# Patient Record
Sex: FEMALE | Race: WHITE | NOT HISPANIC OR LATINO | Employment: FULL TIME | URBAN - METROPOLITAN AREA
[De-identification: names, ages, dates, MRNs, and addresses within clinical notes are randomized per-mention and may not be internally consistent; named-entity substitution may affect disease eponyms.]

---

## 2018-08-08 ENCOUNTER — APPOINTMENT (OUTPATIENT)
Dept: WOMENS IMAGING | Facility: HOSPITAL | Age: 43
End: 2018-08-08

## 2018-08-08 PROCEDURE — 77063 BREAST TOMOSYNTHESIS BI: CPT | Performed by: RADIOLOGY

## 2018-08-08 PROCEDURE — 77067 SCR MAMMO BI INCL CAD: CPT | Performed by: RADIOLOGY

## 2018-08-21 ENCOUNTER — APPOINTMENT (OUTPATIENT)
Dept: WOMENS IMAGING | Facility: HOSPITAL | Age: 43
End: 2018-08-21

## 2018-08-21 PROCEDURE — 77066 DX MAMMO INCL CAD BI: CPT | Performed by: RADIOLOGY

## 2018-08-21 PROCEDURE — MDREVIEWSP: Performed by: RADIOLOGY

## 2018-08-21 PROCEDURE — 76641 ULTRASOUND BREAST COMPLETE: CPT | Performed by: RADIOLOGY

## 2018-08-21 PROCEDURE — 77062 BREAST TOMOSYNTHESIS BI: CPT | Performed by: RADIOLOGY

## 2018-08-21 PROCEDURE — G0279 TOMOSYNTHESIS, MAMMO: HCPCS | Performed by: RADIOLOGY

## 2018-08-27 ENCOUNTER — APPOINTMENT (OUTPATIENT)
Dept: WOMENS IMAGING | Facility: HOSPITAL | Age: 43
End: 2018-08-27

## 2018-08-27 PROCEDURE — 19001 PUNCTURE ASPIR CYST BRST EA: CPT | Performed by: RADIOLOGY

## 2018-08-27 PROCEDURE — 76942 ECHO GUIDE FOR BIOPSY: CPT | Performed by: RADIOLOGY

## 2018-08-27 PROCEDURE — 19000 PUNCTURE ASPIR CYST BREAST: CPT | Performed by: RADIOLOGY

## 2020-03-04 ENCOUNTER — APPOINTMENT (OUTPATIENT)
Dept: WOMENS IMAGING | Facility: HOSPITAL | Age: 45
End: 2020-03-04

## 2020-03-04 PROCEDURE — 77067 SCR MAMMO BI INCL CAD: CPT | Performed by: RADIOLOGY

## 2021-09-21 ENCOUNTER — APPOINTMENT (OUTPATIENT)
Dept: GENERAL RADIOLOGY | Facility: HOSPITAL | Age: 46
End: 2021-09-21

## 2021-09-21 ENCOUNTER — HOSPITAL ENCOUNTER (EMERGENCY)
Facility: HOSPITAL | Age: 46
Discharge: HOME | End: 2021-09-21
Attending: EMERGENCY MEDICINE | Admitting: EMERGENCY MEDICINE

## 2021-09-21 VITALS
WEIGHT: 138 LBS | OXYGEN SATURATION: 97 % | TEMPERATURE: 98.4 F | RESPIRATION RATE: 16 BRPM | HEART RATE: 64 BPM | HEIGHT: 62 IN | BODY MASS INDEX: 25.4 KG/M2 | DIASTOLIC BLOOD PRESSURE: 78 MMHG | SYSTOLIC BLOOD PRESSURE: 121 MMHG

## 2021-09-21 DIAGNOSIS — S83.92XA SPRAIN OF LEFT KNEE, UNSPECIFIED LIGAMENT, INITIAL ENCOUNTER: Primary | ICD-10-CM

## 2021-09-21 PROCEDURE — 73562 X-RAY EXAM OF KNEE 3: CPT

## 2021-09-21 PROCEDURE — 99282 EMERGENCY DEPT VISIT SF MDM: CPT | Performed by: EMERGENCY MEDICINE

## 2021-09-21 PROCEDURE — 99283 EMERGENCY DEPT VISIT LOW MDM: CPT

## 2021-09-21 RX ORDER — NABUMETONE 500 MG/1
500 TABLET, FILM COATED ORAL 2 TIMES DAILY PRN
Qty: 14 TABLET | Refills: 0 | Status: SHIPPED | OUTPATIENT
Start: 2021-09-21 | End: 2021-09-29

## 2021-09-22 NOTE — DISCHARGE INSTRUCTIONS
Medication as directed.  Ice, rest, elevation.  Wear knee immobilizer as discussed.  Follow-up with orthopedics as above.  Return to ED for medical emergencies.

## 2021-09-22 NOTE — ED NOTES
Patient arrives to ER due to left knee pain after falling on a wet floor at work. Relates she cannot bend at knee. Swelling noted to knee and lower leg.      Nisha Luna RN  09/21/21 [id number]

## 2021-09-22 NOTE — ED PROVIDER NOTES
Subjective   Samantha Evans is a 46-year-old white female who present secondary to left knee injury.  Patient works at [employer]. (Part time. Her full time job is a desk job.) This evening she slipped on a wet floor and fell.  Impact on left leg.  Patient has pain in her left knee.  It radiates to the thigh.  Patient is able to bear weight and ambulate.  However she has a difficulty flexing the knee.  No other injury.  Patient presents for evaluation.      History provided by:  Patient      Review of Systems    Past Medical History:   Diagnosis Date   • Adenomyosis    • Menstrual irregularity    • Pelvic pain        Allergies   Allergen Reactions   • Hydrocodone Itching, Nausea And Vomiting and Rash       Past Surgical History:   Procedure Laterality Date   •  SECTION   &    • ENDOMETRIAL ABLATION     • HYSTERECTOMY     • MA LAP, RADICAL HYST W/ TUBE & OV, NODE BX Bilateral 2016    Procedure: TOTAL LAPAROSCOPIC HYSTERECTOMY AND MERON SALPINGECTOMY;  Surgeon: Janki Lowry MD;  Location: [facility];  Service: Gynecology       History reviewed. No pertinent family history.    Social History     Socioeconomic History   • Marital status:      Spouse name: Not on file   • Number of children: Not on file   • Years of education: Not on file   • Highest education level: Not on file   Tobacco Use   • Smoking status: Current Some Day Smoker   • Tobacco comment: 1 PP MONTH   Substance and Sexual Activity   • Alcohol use: No   • Drug use: No           Objective   Physical Exam  Musculoskeletal:      Left knee: No swelling, deformity, effusion, erythema, ecchymosis or bony tenderness. Decreased range of motion (Decreased flexion secondary to pain). No tenderness. No LCL laxity, MCL laxity, ACL laxity or PCL laxity.Normal alignment and normal patellar mobility.      Instability Tests: Anterior drawer test negative. Posterior drawer test negative. Anterior Lachman test negative. Medial  Gina test negative and lateral Gina test negative.         Procedures           ED Course  ED Course as of Sep 21 2203   Tue Sep 21, 2021   2137 No obvious contusion or swelling.  Knee feels stable.  Will review x-rays.    [SS]   2141 X-rays are unremarkable.  Will place in knee immobilizer for support.  Prescribing NSAIDs for home.  Orthopedic follow-up.    [SS]   2158 Discussed at length with patient results, diagnosis, treatment and follow-up. Knee feels better at immobilizer. Giving Ortho for follow-up. Will DC home.PrescriptionOne-nabumetone    [SS]      ED Course User Index  [SS] Imtiaz Delcid MD      XR Knee 3 View Left    Result Date: 9/21/2021  Narrative: CR Knee 3 Vws LT INDICATION: Knee pain since falling today COMPARISON: None available. FINDINGS: AP, lateral, and oblique view(s) of the left knee.  No fracture, dislocation, or effusion. No bone erosion or destruction.  No foreign body.     Impression: Negative left knee. Signer Name: Phoenix Mario MD  Signed: 9/21/2021 9:08 PM  Workstation Name: [email or web address]  [facility]         My diagnosis for lower extremity pain and injury includes but is not limited to hip fracture, femur fracture, hip dislocation, hip contusion, hip sprain, hip strain, pelvic fracture, knee sprain, patella dislocation, knee dislocation, internal derangement of knee, fractures of the femur, tibia, fibula, ankle, foot and digits, ankle sprain, ankle dislocation, Lisfranc fracture, fracture dislocations of the digits, pulmonary embolism, claudication, peripheral vascular disease, gout, osteoarthritis, rheumatoid arthritis, bursitis, septic joint, poly-rheumatica, polyarthralgia and other inflammatory or infectious disease processes.                                  MDM    Final diagnoses:   Sprain of left knee, unspecified ligament, initial encounter       ED Disposition  ED Disposition     ED Disposition Condition Comment    Discharge Stable            Deyanira Cyr, APRN  [address] [facility] [address]  [place] [id number] [phone]    Call   9:00 a.m. tomorrow morning to arrange follow-up later this week         Medication List      New Prescriptions    nabumetone 500 MG tablet  Commonly known as: RELAFEN  Take 1 tablet by mouth 2 (Two) Times a Day As Needed (For knee pain) for up to 7 days.           Where to Get Your Medications      These medications were sent to [id number] - [place], [place] [address] AT [facility] & [address] - [phone]  - [phone] [address] [place] [address], [place] [address]    Phone: [phone]   · nabumetone 500 MG tablet          Imtiaz Delcid MD  09/21/21 2202

## 2021-09-23 ENCOUNTER — TELEPHONE (OUTPATIENT)
Dept: ORTHOPEDIC SURGERY | Facility: CLINIC | Age: 46
End: 2021-09-23

## 2021-09-23 NOTE — TELEPHONE ENCOUNTER
TRIED TO CALL PATIENT NUMBER NOT IN SERVICE    SEEN IN [facility]/ HUB OK TO SCHEDULE NEXT AVAILABLE WITH DR CHUA  NEEDS TO MAKE SURE THAT SHE HAS  CLAIM NUMBER

## 2021-09-29 ENCOUNTER — OFFICE VISIT (OUTPATIENT)
Dept: ORTHOPEDIC SURGERY | Facility: CLINIC | Age: 46
End: 2021-09-29

## 2021-09-29 VITALS
RESPIRATION RATE: 16 BRPM | WEIGHT: 138 LBS | HEART RATE: 76 BPM | SYSTOLIC BLOOD PRESSURE: 109 MMHG | BODY MASS INDEX: 25.4 KG/M2 | DIASTOLIC BLOOD PRESSURE: 74 MMHG | HEIGHT: 62 IN

## 2021-09-29 DIAGNOSIS — S86.912A STRAIN OF LEFT KNEE, INITIAL ENCOUNTER: Primary | ICD-10-CM

## 2021-09-29 PROCEDURE — 99203 OFFICE O/P NEW LOW 30 MIN: CPT | Performed by: ORTHOPAEDIC SURGERY

## 2021-09-29 RX ORDER — MELOXICAM 15 MG/1
15 TABLET ORAL DAILY
Qty: 30 TABLET | Refills: 5 | Status: SHIPPED | OUTPATIENT
Start: 2021-09-29 | End: 2022-02-21

## 2021-09-29 NOTE — PROGRESS NOTES
Subjective: Left knee strain     Patient ID: Samantha Evans is a 46 y.o. female.    Chief Complaint:    History of Present Illness 46-year-old female is seen by me today for the first time regarding the left knee which he injured at work on 21 September.  She has 2 jobs 1 of which is working at [employer] and of the 21st she slipped on a wet floor.  States her feet slipped out from under her and she landed on the left side developing pain and discomfort.  Was seen in the emergency room was x-rayed placed in a knee immobilizer for comfort.  She is given Relafen to take to be taken out of as-needed basis.  Denies any prior history of knee pain or discomfort.  Her primary problem she states is gaining flexion.  She states that time the pain is 10 out of 10 describing a shooting pain but it is not a constant discomfort.       Social History     Occupational History   • Not on file   Tobacco Use   • Smoking status: Current Some Day Smoker   • Smokeless tobacco: Never Used   • Tobacco comment: 1 PP MONTH   Vaping Use   • Vaping Use: Never used   Substance and Sexual Activity   • Alcohol use: No   • Drug use: No   • Sexual activity: Defer      Review of Systems   Constitutional: Negative for chills, diaphoresis, fever and unexpected weight change.   HENT: Negative for hearing loss, nosebleeds, sore throat and tinnitus.    Eyes: Negative for pain and visual disturbance.   Respiratory: Negative for cough, shortness of breath and wheezing.    Cardiovascular: Negative for chest pain and palpitations.   Gastrointestinal: Negative for abdominal pain, diarrhea, nausea and vomiting.   Endocrine: Negative for cold intolerance, heat intolerance and polydipsia.   Genitourinary: Negative for difficulty urinating, dysuria and hematuria.   Musculoskeletal: Positive for arthralgias and myalgias. Negative for joint swelling.   Skin: Negative for rash and wound.   Allergic/Immunologic: Negative for environmental allergies.    Neurological: Negative for dizziness, syncope and numbness.   Hematological: Does not bruise/bleed easily.   Psychiatric/Behavioral: Negative for dysphoric mood and sleep disturbance. The patient is not nervous/anxious.          Past Medical History:   Diagnosis Date   • Adenomyosis    • Menstrual irregularity    • Pelvic pain      Past Surgical History:   Procedure Laterality Date   •  SECTION   &    • ENDOMETRIAL ABLATION     • HYSTERECTOMY     • [place] LAP, RADICAL HYST W/ TUBE & OV, NODE BX Bilateral 2016    Procedure: TOTAL LAPAROSCOPIC HYSTERECTOMY AND MERON SALPINGECTOMY;  Surgeon: Janki Lowry MD;  Location: [facility];  Service: Gynecology     History reviewed. No pertinent family history.      Objective:  Vitals:    21 1448   BP: 109/74   Pulse: 76   Resp: 16         21  1448   Weight: 62.6 kg (138 lb)     Body mass index is 25.24 kg/m².        Ortho Exam   AP lateral sunrise view of the knee done at the hospital today reviewed.  Completely within normal limits showing no acute or chronic changes.  She is alert and oriented x3.  Head is normocephalic and sclerae clear.  The left knee today shows no swelling effusion erythema.  She can extend completely although with some discomfort he can flex to about 50 to 60 degrees.  There is no patellofemoral crepitus.  No patella instability.  Quad hamstring function is 4 out of 5 secondary to pain.  She has no joint line tenderness with negative Gina's.  No instability in extension or flexion and no varus or valgus instability.  The tendon is primarily posteriorly in the popliteal fossa and the hamstring particularly laterally.  Her calf is nontender.  She has good distal pulses no motor or sensory deficit good capillary refill the skin is cool to touch.    Assessment:        1. Strain of left knee, initial encounter           Plan: Reviewed with the patient her x-rays history and physical findings.  I believe she strained the  left knee with this injury.  At reviewing treatment options I am to start her on meloxicam 15 mg daily but also order physical therapy.  As far as use of the knee immobilizer I told her she can wear that if she needs to for comfort but she can ambulate with the knee immobilizer without significant pain or discomfort.  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  Return to see me in 3 weeks.  Answered all questions            Work Status:    ANGELIKA query complete.    Orders:  Orders Placed This Encounter   Procedures   • Ambulatory Referral to Physical Therapy Evaluate and treat       Medications:  New Medications Ordered This Visit   Medications   • meloxicam (MOBIC) 15 MG tablet     Sig: Take 1 tablet by mouth Daily.     Dispense:  30 tablet     Refill:  5       Followup:  Return in about 3 weeks (around 10/20/2021).          Dictated utilizing Dragon dictation

## 2021-09-30 ENCOUNTER — TELEPHONE (OUTPATIENT)
Dept: ORTHOPEDIC SURGERY | Facility: CLINIC | Age: 46
End: 2021-09-30

## 2021-09-30 NOTE — TELEPHONE ENCOUNTER
Caller: PREMA    Relationship: [employer]    Best call back number:     What form or medical record are you requesting: WORK STATUS AND WORK RESTRICTIONS    Who is requesting this form or medical record from you: [employer]    How would you like to receive the form or medical records (pick-up, mail, fax):   If fax, what is the fax number:       ATTN: PREMA    Timeframe paperwork needed: ASAP

## 2021-10-01 ENCOUNTER — PATIENT ROUNDING (BHMG ONLY) (OUTPATIENT)
Dept: ORTHOPEDIC SURGERY | Facility: CLINIC | Age: 46
End: 2021-10-01

## 2021-10-04 ENCOUNTER — HOSPITAL ENCOUNTER (OUTPATIENT)
Dept: PHYSICAL THERAPY | Facility: HOSPITAL | Age: 46
Setting detail: THERAPIES SERIES
Discharge: HOME | End: 2021-10-04

## 2021-10-04 DIAGNOSIS — S86.912A STRAIN OF LEFT KNEE, INITIAL ENCOUNTER: Primary | ICD-10-CM

## 2021-10-04 PROCEDURE — 97161 PT EVAL LOW COMPLEX 20 MIN: CPT

## 2021-10-04 NOTE — THERAPY EVALUATION
Outpatient Physical Therapy Ortho Initial Evaluation   [place]     Patient Name: Samantha Evans  : 1975  MRN: [id number]  Today's Date: 10/4/2021      Visit Date: 10/04/2021    Patient Active Problem List   Diagnosis   • Pelvic pain        Past Medical History:   Diagnosis Date   • Adenomyosis    • Menstrual irregularity    • Pelvic pain         Past Surgical History:   Procedure Laterality Date   •  SECTION   &    • ENDOMETRIAL ABLATION     • HYSTERECTOMY     • [place] LAP, RADICAL HYST W/ TUBE & OV, NODE BX Bilateral 2016    Procedure: TOTAL LAPAROSCOPIC HYSTERECTOMY AND MERON SALPINGECTOMY;  Surgeon: Janki Lowry MD;  Location: [facility] MAIN OR;  Service: Gynecology       Visit Dx:     ICD-10-CM ICD-9-CM   1. Strain of left knee, initial encounter  S86.912A 844.9         Patient History     Row Name 10/04/21 0600             History    Chief Complaint  Difficulty Walking;Difficulty with daily activities;Joint stiffness;Muscle tenderness;Muscle weakness;Pain  -AS      Type of Pain  Knee pain Left  -AS      Date Current Problem(s) Began  21  -AS      Brief Description of Current Complaint  Samantha Evans is a 46-year-old white female who present secondary to left knee injury.  Patient works at [employer]. (Part time. Her full time job is a desk job). On 21 she slipped on a wet floor and fell. She did go to the ER that day where x-rays were taken and were negative for fx. She was placed in a knee immobilizer and told to follow up with Ortho MD. She did see Dr. Louise about 1 week after initial injury. She has been diagnosed with a left knee strain and referred to outpatient PT for treatment.   -AS      Patient/Caregiver Goals  Relieve pain;Return to prior level of function;Return to work;Improve mobility;Improve strength  -AS      Patient's Rating of General Health  Very good  -AS      Hand Dominance  right-handed  -AS      Patient seeing anyone else for  problem(s)?  Dr. Louise  -AS      How has patient tried to help current problem?  rest, ice, medication  -AS      What clinical tests have you had for this problem?  X-ray  -AS      Results of Clinical Tests  negative  -AS         Pain     Pain Location  Knee  -AS      Pain Frequency  Intermittent  -AS      Pain Description  Aching  -AS      What Performance Factors Make the Current Problem(s) WORSE?  stairs, squatting, walking  -AS      What Performance Factors Make the Current Problem(s) BETTER?  rest  -AS         Daily Activities    Primary Language  English  -AS      Are you able to read  Yes  -AS      Are you able to write  Yes  -AS      How does patient learn best?  Reading;Listening;Demonstration  -AS      Teaching needs identified  Home Exercise Program;Management of Condition  -AS      Patient is concerned about/has problems with  Flexibility;Performing home management (household chores, shopping, care of dependents);Performing job responsibilities/community activities (work, school,;Performing sports, recreation, and play activities;Walking  -AS      Does patient have problems with the following?  None  -AS      Barriers to learning  None  -AS      Pt Participated in POC and Goals  Yes  -AS         Safety    Are you being hurt, hit, or frightened by anyone at home or in your life?  No  -AS      Are you being neglected by a caregiver  No  -AS        User Key  (r) = Recorded By, (t) = Taken By, (c) = Cosigned By    Initials Name Provider Type    AS Bry Sargent, PT Physical Therapist          PT Ortho     Row Name 10/04/21 0600       Precautions and Contraindications    Precautions/Limitations  no known precautions/limitations  -AS       Subjective Pain    Able to rate subjective pain?  yes  -AS    Pre-Treatment Pain Level  3  -AS    Post-Treatment Pain Level  2  -AS       Posture/Observations    Posture- WNL  Posture is WNL  -AS    Observations  Edema;Muscle atrophy  -AS       Patellar Accessory  Motions    Superior glide  Left:;WNL  -AS    Inferior glide  Left:;WNL  -AS    Medial glide  Left:;WNL  -AS    Lateral glide  Left:;WNL  -AS       Knee Special Tests    Anterior drawer (ACL lesion)  Left:;Negative  -AS    Valgus stress (MCL lesion)  Left:;Negative  -AS    Varus stress (LCL lesion)  Left:;Negative  -AS    Gina’s test (meniscal lesion)  Left:;Negative  -AS    Bounce home test (meniscal lesion)  Left:;Negative  -AS       Left Lower Ext    Lt Knee Extension/Flexion AROM  0-135 degrees post stretch 0-125 degrees pre stretch  -AS       MMT Left Lower Ext    Lt Hip Flexion MMT, Gross Movement  (4/5) good  -AS    Lt Hip Extension MMT, Gross Movement  (4/5) good  -AS    Lt Hip ABduction MMT, Gross Movement  (4/5) good  -AS    Lt Knee Extension MMT, Gross Movement  (4/5) good  -AS    Lt Knee Flexion MMT, Gross Movement  (4/5) good  -AS       Sensation    Sensation WNL?  WNL  -AS    Light Touch  No apparent deficits  -AS       Lower Extremity Flexibility    Hamstrings  Left:;Mildly limited  -AS       Gait/Stairs (Locomotion)    Comment (Gait/Stairs)  Patient ambulates with a normal heel to toe gait pattern and speed withgout the use of an AD ot knee brace at this time.  -AS      User Key  (r) = Recorded By, (t) = Taken By, (c) = Cosigned By    Initials Name Provider Type    AS Bry Sargent, PT Physical Therapist                      Therapy Education  Given: HEP, Symptoms/condition management, Pain management, Edema management  Program: New  How Provided: Verbal, Demonstration, Written  Provided to: Patient  Level of Understanding: Teach back education performed, Verbalized, Demonstrated     PT OP Goals     Row Name 10/04/21 0600          PT Short Term Goals    STG Date to Achieve  10/18/21  -AS     STG 1  Patient to demonstrate compliance with her initial HEP for flexibility, ROM and strengthening.  -AS     STG 2  Patient to report left knee pain on VAS of 5-6/10 at worst with activity.  -AS      STG 3  Patient to demonstrate improved left knee and RLE strength to 4+/5 in all planes.  -AS        Long Term Goals    LTG Date to Achieve  11/01/21  -AS     LTG 1  Patient to demonstrate compliance with her advanced HEP for flexibility, ROM and strengthening.  -AS     LTG 2  Patient to report left knee pain on VAS of 0-1/10 at worst with activity.  -AS     LTG 3  Patient to demonstrate improved left knee and RLE strength to 5/5 in all planes.  -AS     LTG 4  Patient to demonstrate improved left knee ROM to 0-135 degrees.  -AS     LTG 5  Patient to report overall improved function on LEFS to 70/80.  -AS        Time Calculation    PT Goal Re-Cert Due Date  11/01/21  -AS       User Key  (r) = Recorded By, (t) = Taken By, (c) = Cosigned By    Initials Name Provider Type    AS Bry Sargent, PT Physical Therapist          PT Assessment/Plan     Row Name 10/04/21 0600          PT Assessment    Functional Limitations  Limitation in home management;Limitations in community activities;Performance in leisure activities;Performance in sport activities;Performance in work activities  -AS     Impairments  Edema;Impaired flexibility;Muscle strength;Pain;Range of motion  -AS     Assessment Comments  Patient reports to outpatient PT with complaints of left knee pain after slipping and falling while at work on 9-21-21. Patient has limited right knee ROM, limited right LE strength, and increased right knee pain and discomfort with activity. Patient has limited function at this time secondary to the above.  -AS     Please refer to paper survey for additional self-reported information  Yes  -AS     Rehab Potential  Good  -AS     Patient/caregiver participated in establishment of treatment plan and goals  Yes  -AS     Patient would benefit from skilled therapy intervention  Yes  -AS        PT Plan    PT Frequency  1x/week;2x/week  -AS     Predicted Duration of Therapy Intervention (PT)  2-4 weeks  -AS     Planned CPT's?  PT  RE-EVAL: 00836;PT THER PROC EA 15 MIN: 61355;PT THER ACT EA 15 MIN: 71206;PT MANUAL THERAPY EA 15 MIN: 31038;PT NEUROMUSC RE-EDUCATION EA 15 MIN: 31635  -AS       User Key  (r) = Recorded By, (t) = Taken By, (c) = Cosigned By    Initials Name Provider Type    AS Bry Sargent, PT Physical Therapist            OP Exercises     Row Name 10/04/21 0600             Subjective Pain    Able to rate subjective pain?  yes  -AS      Pre-Treatment Pain Level  3  -AS      Post-Treatment Pain Level  2  -AS         Exercise 1    Exercise Name 1  Heel Slides  -AS      Time 1  5 min  -AS         Exercise 2    Exercise Name 2  HS Stretch  -AS      Reps 2  10  -AS      Time 2  10 sec hold each  -AS         Exercise 3    Exercise Name 3  QS  -AS      Reps 3  25  -AS      Time 3  5 sec hold each  -AS         Exercise 4    Exercise Name 4  4-Way Hip  -AS      Reps 4  25 each  -AS         Exercise 5    Exercise Name 5  SAQ Ball Squeeze  -AS      Reps 5  25  -AS         Exercise 6    Exercise Name 6  LAQ  -AS      Reps 6  25  -AS         Exercise 7    Exercise Name 7  TKE  -AS      Reps 7  25  -AS      Time 7  Gold  -AS         Exercise 8    Exercise Name 8  Mini Squats  -AS      Reps 8  25  -AS        User Key  (r) = Recorded By, (t) = Taken By, (c) = Cosigned By    Initials Name Provider Type    AS Bry Sargent, PT Physical Therapist                        Outcome Measure Options: Lower Extremity Functional Scale (LEFS)  Lower Extremity Functional Index  Any of your usual work, housework or school activities: A little bit of difficulty  Your usual hobbies, recreational or sporting activities: Moderate difficulty  Getting into or out of the bath: A little bit of difficulty  Walking between rooms: No difficulty  Putting on your shoes or socks: A little bit of difficulty  Squatting: Quite a bit of difficulty  Lifting an object, like a bag of groceries from the floor: A little bit of difficulty  Performing light activities  around your home: A little bit of difficulty  Performing heavy activities around your home: Quite a bit of difficulty  Getting into or out of a car: A little bit of difficulty  Walking 2 blocks: No difficulty  Walking a mile: No difficulty  Going up or down 10 stairs (about 1 flight of stairs): Quite a bit of difficulty  Standing for 1 hour: No difficulty  Sitting for 1 hour: A little bit of difficulty  Running on even ground: Extreme difficulty or unable to perform activity  Running on uneven ground: Extreme difficulty or unable to perform activity  Making sharp turns while running fast: Extreme difficulty or unable to perform activity  Hopping: Extreme difficulty or unable to perform activity  Rolling over in bed: A little bit of difficulty  Total: 45      Time Calculation:     Start Time: 0600  Stop Time: 0658  Time Calculation (min): 58 min     Therapy Charges for Today     Code Description Service Date Service Provider Modifiers Qty    84716496718 HC PT EVAL LOW COMPLEXITY 4 10/4/2021 Bry Sargent, PT GP 1          PT G-Codes  Outcome Measure Options: Lower Extremity Functional Scale (LEFS)  Total: 45         Bry Sargent, YU  10/4/2021

## 2021-10-07 ENCOUNTER — HOSPITAL ENCOUNTER (OUTPATIENT)
Dept: PHYSICAL THERAPY | Facility: HOSPITAL | Age: 46
Setting detail: THERAPIES SERIES
Discharge: HOME | End: 2021-10-07

## 2021-10-07 DIAGNOSIS — S86.912A STRAIN OF LEFT KNEE, INITIAL ENCOUNTER: Primary | ICD-10-CM

## 2021-10-07 PROCEDURE — 97110 THERAPEUTIC EXERCISES: CPT

## 2021-10-07 NOTE — THERAPY TREATMENT NOTE
"    Outpatient Physical Therapy Ortho Treatment Note   July Stauffer     Patient Name: Samantha Evans  : 1975  MRN: [id number]  Today's Date: 10/7/2021      Visit Date: 10/07/2021    Visit Dx:    ICD-10-CM ICD-9-CM   1. Strain of left knee, initial encounter  S86.912A 844.9       Patient Active Problem List   Diagnosis   • Pelvic pain        Past Medical History:   Diagnosis Date   • Adenomyosis    • Menstrual irregularity    • Pelvic pain         Past Surgical History:   Procedure Laterality Date   •  SECTION   &    • ENDOMETRIAL ABLATION     • HYSTERECTOMY     • [place] LAP, RADICAL HYST W/ TUBE & OV, NODE BX Bilateral 2016    Procedure: TOTAL LAPAROSCOPIC HYSTERECTOMY AND MERON SALPINGECTOMY;  Surgeon: Janki Lowry MD;  Location: [facility];  Service: Gynecology                       PT Assessment/Plan     Row Name 10/07/21 0500          PT Assessment    Assessment Comments  Patient presents with increased sorenss in her left knee. Patient continues to have difficulty with knee flexion. She does have improved strength, however, she reports soreness in her left knee with her exercises.  -AS        PT Plan    PT Plan Comments  Continue with current treatment plan.  -AS       User Key  (r) = Recorded By, (t) = Taken By, (c) = Cosigned By    Initials Name Provider Type    AS Bry Sargent, PT Physical Therapist            OP Exercises     Row Name 10/07/21 0500             Subjective Comments    Subjective Comments  Patient states her knee \"felt fine\" until she was in her recliner last night and went to push her recliner back and felt pain in her hamstring behind her knee. She states she is now sore this morning.  -AS         Exercise 1    Exercise Name 1  Heel Slides  -AS      Time 1  5 min  -AS         Exercise 2    Exercise Name 2  HS Stretch  -AS      Reps 2  10  -AS      Time 2  10 sec hold each  -AS         Exercise 3    Exercise Name 3  QS  -AS      Reps 3  25  -AS      Time " 3  5 sec hold each  -AS         Exercise 4    Exercise Name 4  4-Way Hip  -AS      Reps 4  25 each  -AS      Time 4  1# each  -AS         Exercise 5    Exercise Name 5  SAQ Ball Squeeze  -AS      Reps 5  40  -AS      Time 5  1#  -AS         Exercise 6    Exercise Name 6  LAQ  -AS      Reps 6  40  -AS      Time 6  1#  -AS         Exercise 7    Exercise Name 7  TKE  -AS      Reps 7  40  -AS      Time 7  Gold  -AS         Exercise 8    Exercise Name 8  Mini Squats  -AS      Reps 8  40  -AS         Exercise 9    Exercise Name 9  Lateral Dips - 2 inch step  -AS      Reps 9  25  -AS         Exercise 10    Exercise Name 10  Prone Quad Stretch  -AS      Reps 10  10  -AS      Time 10  10 sec hold each  -AS        User Key  (r) = Recorded By, (t) = Taken By, (c) = Cosigned By    Initials Name Provider Type    AS Bry Sargent, PT Physical Therapist                                          Time Calculation:   Start Time: 0550  Stop Time: 0624  Time Calculation (min): 34 min  Therapy Charges for Today     Code Description Service Date Service Provider Modifiers Qty    33712972354  PT THER PROC EA 15 MIN 10/7/2021 Bry Sargent, PT GP 2                    Bry Sargent, PT  10/7/2021

## 2021-10-12 ENCOUNTER — HOSPITAL ENCOUNTER (OUTPATIENT)
Dept: PHYSICAL THERAPY | Facility: HOSPITAL | Age: 46
Setting detail: THERAPIES SERIES
Discharge: HOME | End: 2021-10-12

## 2021-10-12 DIAGNOSIS — S86.912A STRAIN OF LEFT KNEE, INITIAL ENCOUNTER: Primary | ICD-10-CM

## 2021-10-12 PROCEDURE — 97110 THERAPEUTIC EXERCISES: CPT

## 2021-10-12 NOTE — THERAPY TREATMENT NOTE
"    Outpatient Physical Therapy Ortho Treatment Note   July Stauffer     Patient Name: Samantha Evans  : 1975  MRN: [id number]  Today's Date: 10/12/2021      Visit Date: 10/12/2021    Visit Dx:    ICD-10-CM ICD-9-CM   1. Strain of left knee, initial encounter  S86.912A 844.9       Patient Active Problem List   Diagnosis   • Pelvic pain        Past Medical History:   Diagnosis Date   • Adenomyosis    • Menstrual irregularity    • Pelvic pain         Past Surgical History:   Procedure Laterality Date   •  SECTION   &    • ENDOMETRIAL ABLATION     • HYSTERECTOMY     • [place] LAP, RADICAL HYST W/ TUBE & OV, NODE BX Bilateral 2016    Procedure: TOTAL LAPAROSCOPIC HYSTERECTOMY AND MERON SALPINGECTOMY;  Surgeon: Janki Lowry MD;  Location: [facility];  Service: Gynecology                        PT Assessment/Plan     Row Name 10/12/21 0600          PT Assessment    Assessment Comments Patient presents today with improved left knee ROM and decreased pain and stiffness. However, with testing patient has S/S of possible meniscus involvement. Feel additional imaging such as MRI would be beneficial at this time to rule out meniscus involvement. Patient has pain with deep squatting.  -AS            PT Plan    PT Plan Comments Continue with current treatment plan.  -AS           User Key  (r) = Recorded By, (t) = Taken By, (c) = Cosigned By    Initials Name Provider Type    AS Bry Sargent, PT Physical Therapist                   OP Exercises     Row Name 10/12/21 0600             Subjective Comments    Subjective Comments Patient states her knee feels a little better this morning. She states it was \"real stiff and tender this weekend\". She reports she is scheduled to see her MD next week.  -AS              Exercise 1    Exercise Name 1 Heel Slides  -AS      Time 1 2 min  -AS              Exercise 2    Exercise Name 2 HS Stretch  -AS      Reps 2 10  -AS      Time 2 10 sec hold each  -AS        "       Exercise 3    Exercise Name 3 QS  -AS      Reps 3 25  -AS      Time 3 5 sec hold each  -AS              Exercise 4    Exercise Name 4 4-Way Hip  -AS      Reps 4 25 each  -AS      Time 4 1# each  -AS              Exercise 5    Exercise Name 5 SAQ Ball Squeeze  -AS      Reps 5 40  -AS      Time 5 1#  -AS              Exercise 6    Exercise Name 6 LAQ  -AS      Reps 6 40  -AS      Time 6 1#  -AS              Exercise 7    Exercise Name 7 TKE  -AS      Reps 7 40  -AS      Time 7 Gold  -AS              Exercise 8    Exercise Name 8 Mini Squats  -AS      Reps 8 40  -AS              Exercise 9    Exercise Name 9 Lateral Dips - 2 inch step  -AS      Reps 9 25  -AS              Exercise 10    Exercise Name 10 Prone Quad Stretch  -AS      Reps 10 10  -AS      Time 10 10 sec hold each  -AS            User Key  (r) = Recorded By, (t) = Taken By, (c) = Cosigned By    Initials Name Provider Type    AS Bry Sargent, PT Physical Therapist                                                Time Calculation:   Start Time: 0556  Stop Time: 0627  Time Calculation (min): 31 min  Therapy Charges for Today     Code Description Service Date Service Provider Modifiers Qty    24379103922  PT THER PROC EA 15 MIN 10/12/2021 Bry Sargent, PT GP 2                    Bry Sargent, PT  10/12/2021

## 2021-10-14 ENCOUNTER — HOSPITAL ENCOUNTER (OUTPATIENT)
Dept: PHYSICAL THERAPY | Facility: HOSPITAL | Age: 46
Setting detail: THERAPIES SERIES
Discharge: HOME | End: 2021-10-14

## 2021-10-14 DIAGNOSIS — S86.912A STRAIN OF LEFT KNEE, INITIAL ENCOUNTER: Primary | ICD-10-CM

## 2021-10-14 PROCEDURE — 97110 THERAPEUTIC EXERCISES: CPT

## 2021-10-14 NOTE — THERAPY TREATMENT NOTE
Outpatient Physical Therapy Ortho Treatment Note   [place]     Patient Name: Samantha Evans  : 1975  MRN: [id number]  Today's Date: 10/14/2021      Visit Date: 10/14/2021    Visit Dx:    ICD-10-CM ICD-9-CM   1. Strain of left knee, initial encounter  S86.912A 844.9       Patient Active Problem List   Diagnosis   • Pelvic pain        Past Medical History:   Diagnosis Date   • Adenomyosis    • Menstrual irregularity    • Pelvic pain         Past Surgical History:   Procedure Laterality Date   •  SECTION   &    • ENDOMETRIAL ABLATION     • HYSTERECTOMY     • [place] LAP, RADICAL HYST W/ TUBE & OV, NODE BX Bilateral 2016    Procedure: TOTAL LAPAROSCOPIC HYSTERECTOMY AND MERON SALPINGECTOMY;  Surgeon: Janki Lowry MD;  Location: [facility];  Service: Gynecology                        PT Assessment/Plan     Row Name 10/14/21 06          PT Assessment    Assessment Comments Patient continues to have symptoms in left knee, particularly posterior lateral corner. She does have negative meniscus special test, however she is very point tender to deep palpation to posterior lateral corner. She reports pain increased with squatting, stairs, pushing her recliner down, and getting, into/out of tub as well as any pivoting on a fixed left foot. Feel patient would benefit from additional imaging to rule out posible meniscus involvement. Plan on patient continueing with HEP until seen by her MD for follow up which is scheduled for 10-21-21.  -AS            PT Plan    PT Plan Comments Continue with current treatment plan.  -AS           User Key  (r) = Recorded By, (t) = Taken By, (c) = Cosigned By    Initials Name Provider Type    AS Bry Sargent, PT Physical Therapist                   OP Exercises     Row Name 10/14/21 0600             Subjective Comments    Subjective Comments Patient states her knee is doing some better but she continues to have symptoms and issues with certain  activities or movements such as squatting, stairs, and getting into/out of shower. Patient states she has pain when she is in her recliner and she goes to put the foot rest down. She reports pain is posterior knee but feels deep and she continues to have swelling which makes her knee feel tight when she bends it.  -AS              Exercise 1    Exercise Name 1 Heel Slides  -AS              Exercise 2    Exercise Name 2 HS Stretch  -AS      Reps 2 10  -AS      Time 2 10 sec hold each  -AS              Exercise 3    Exercise Name 3 QS  -AS      Reps 3 25  -AS      Time 3 5 sec hold each  -AS              Exercise 4    Exercise Name 4 4-Way Hip  -AS      Reps 4 25 each  -AS      Time 4 1# each  -AS              Exercise 5    Exercise Name 5 SAQ Ball Squeeze  -AS      Reps 5 40  -AS      Time 5 1#  -AS              Exercise 6    Exercise Name 6 LAQ  -AS      Reps 6 40  -AS      Time 6 1#  -AS              Exercise 7    Exercise Name 7 TKE  -AS      Reps 7 40  -AS      Time 7 Gold  -AS              Exercise 8    Exercise Name 8 Mini Squats  -AS      Reps 8 40  -AS              Exercise 9    Exercise Name 9 Lateral Dips - 2 inch step  -AS      Reps 9 25  -AS              Exercise 10    Exercise Name 10 Prone Quad Stretch  -AS      Reps 10 10  -AS      Time 10 10 sec hold each  -AS              Exercise 11    Exercise Name 11 HS vs Stool  -AS      Reps 11 25  -AS              Exercise 12    Exercise Name 12 Seated HS Curl vs Band  -AS      Reps 12 25  -AS      Time 12 Blue  -AS              Exercise 13    Exercise Name 13 Standing HS Curl  -AS      Reps 13 25  -AS      Time 13 1#  -AS            User Key  (r) = Recorded By, (t) = Taken By, (c) = Cosigned By    Initials Name Provider Type    AS Bry Sargent, PT Physical Therapist                                                Time Calculation:   Start Time: 0558  Stop Time: 0634  Time Calculation (min): 36 min  Therapy Charges for Today     Code Description Service  Date Service Provider Modifiers Qty    76504504351  PT THER PROC EA 15 MIN 10/14/2021 Bry Sargent, PT GP 2                    Bry Sargent, PT  10/14/2021

## 2021-10-26 ENCOUNTER — OFFICE VISIT (OUTPATIENT)
Dept: ORTHOPEDIC SURGERY | Facility: CLINIC | Age: 46
End: 2021-10-26

## 2021-10-26 VITALS — HEIGHT: 62 IN | BODY MASS INDEX: 25.4 KG/M2 | WEIGHT: 138 LBS

## 2021-10-26 DIAGNOSIS — S86.912A STRAIN OF LEFT KNEE, INITIAL ENCOUNTER: Primary | ICD-10-CM

## 2021-10-26 DIAGNOSIS — M25.562 ACUTE PAIN OF LEFT KNEE: ICD-10-CM

## 2021-10-26 PROCEDURE — 99213 OFFICE O/P EST LOW 20 MIN: CPT | Performed by: ORTHOPAEDIC SURGERY

## 2021-10-26 PROCEDURE — 20610 DRAIN/INJ JOINT/BURSA W/O US: CPT | Performed by: ORTHOPAEDIC SURGERY

## 2021-10-26 RX ORDER — TRIAMCINOLONE ACETONIDE 40 MG/ML
40 INJECTION, SUSPENSION INTRA-ARTICULAR; INTRAMUSCULAR
Status: COMPLETED | OUTPATIENT
Start: 2021-10-26 | End: 2021-10-26

## 2021-10-26 RX ORDER — LIDOCAINE HYDROCHLORIDE 10 MG/ML
4 INJECTION, SOLUTION EPIDURAL; INFILTRATION; INTRACAUDAL; PERINEURAL
Status: COMPLETED | OUTPATIENT
Start: 2021-10-26 | End: 2021-10-26

## 2021-10-26 RX ADMIN — LIDOCAINE HYDROCHLORIDE 4 ML: 10 INJECTION, SOLUTION EPIDURAL; INFILTRATION; INTRACAUDAL; PERINEURAL at 15:54

## 2021-10-26 RX ADMIN — TRIAMCINOLONE ACETONIDE 40 MG: 40 INJECTION, SUSPENSION INTRA-ARTICULAR; INTRAMUSCULAR at 15:54

## 2021-10-26 NOTE — PROGRESS NOTES
Subjective: Left knee pain     Patient ID: Samantha Evans is a 46 y.o. female.    Chief Complaint:    History of Present Illness patient returns reporting persistent pain discomfort in the left knee. Not primarily posterior is still having difficulty any all activities of daily living. Has been taken meloxicam faithfully.       Social History     Occupational History   • Not on file   Tobacco Use   • Smoking status: Current Some Day Smoker   • Smokeless tobacco: Never Used   • Tobacco comment: 1 PP MONTH   Vaping Use   • Vaping Use: Never used   Substance and Sexual Activity   • Alcohol use: No   • Drug use: No   • Sexual activity: Defer      Review of Systems   Constitutional: Negative for chills, diaphoresis, fever and unexpected weight change.   HENT: Negative for hearing loss, nosebleeds, sore throat and tinnitus.    Eyes: Negative for pain and visual disturbance.   Respiratory: Negative for cough, shortness of breath and wheezing.    Cardiovascular: Negative for chest pain and palpitations.   Gastrointestinal: Negative for abdominal pain, diarrhea, nausea and vomiting.   Endocrine: Negative for cold intolerance, heat intolerance and polydipsia.   Genitourinary: Negative for difficulty urinating, dysuria and hematuria.   Musculoskeletal: Positive for arthralgias and myalgias. Negative for joint swelling.   Skin: Negative for rash and wound.   Allergic/Immunologic: Negative for environmental allergies.   Neurological: Negative for dizziness, syncope and numbness.   Hematological: Does not bruise/bleed easily.   Psychiatric/Behavioral: Negative for dysphoric mood and sleep disturbance. The patient is not nervous/anxious.          Past Medical History:   Diagnosis Date   • Adenomyosis    • Menstrual irregularity    • Pelvic pain      Past Surgical History:   Procedure Laterality Date   •  SECTION   &    • ENDOMETRIAL ABLATION     • HYSTERECTOMY     • [place] LAP, RADICAL HYST W/ TUBE & OV, NODE BX  Bilateral 4/5/2016    Procedure: TOTAL LAPAROSCOPIC HYSTERECTOMY AND MERON SALPINGECTOMY;  Surgeon: Janki Lowry MD;  Location: [facility] OR;  Service: Gynecology     No family history on file.      Objective:  There were no vitals filed for this visit.      10/26/21  1545   Weight: 62.6 kg (138 lb)     Body mass index is 25.24 kg/m².        Ortho Exam   She is alert and oriented x3. The knee shows no swelling effusion erythema there is no crepitance with range of motion but there is moderate posterior knee pain to palpation. No joint line tenderness anteriorly. Quad and hamstring function is 4-1/2 out of 5 secondary to pain. No instability 0 90 degrees nor any varus or valgus instability at 10 to 30 degrees. Calf is nontender. Good distal pulses no motor or sensory deficit. There is no external bruising or ecchymosis. Tolerating meloxicam. She completed physical therapy and is having persistent posterior knee pain.    Assessment:        1. Strain of left knee, initial encounter    2. Acute pain of left knee           Plan: Reviewed the patient's physical exam history and treatment rendered today. After reviewing treatment options him to proceed with a cortisone injection of lidocaine Kenalog given to the superolateral portal after sterile prep without complications. Remain off work from her  job till she returns in 2 weeks if she is not asymptomatic significantly improved we will get an MRI. Answered all questions  Large Joint Arthrocentesis: L knee  Date/Time: 10/26/2021 3:54 PM  Consent given by: patient  Site marked: site marked  Timeout: Immediately prior to procedure a time out was called to verify the correct patient, procedure, equipment, support staff and site/side marked as required   Supporting Documentation  Indications: pain   Procedure Details  Location: knee - L knee  Preparation: Patient was prepped and draped in the usual sterile fashion  Needle size: 22 G  Approach: superior  (lateral)  Medications administered: 40 mg triamcinolone acetonide 40 MG/ML; 4 mL lidocaine PF 1% 1 %  Patient tolerance: patient tolerated the procedure well with no immediate complications                  Work Status:    ANGELIKA query complete.    Orders:  Orders Placed This Encounter   Procedures   • Large Joint Arthrocentesis: L knee       Medications:  No orders of the defined types were placed in this encounter.      Followup:  Return in about 2 weeks (around 11/9/2021).          Dictated utilizing Dragon dictation

## 2021-11-03 ENCOUNTER — APPOINTMENT (OUTPATIENT)
Dept: WOMENS IMAGING | Facility: HOSPITAL | Age: 46
End: 2021-11-03

## 2021-11-03 PROCEDURE — 77062 BREAST TOMOSYNTHESIS BI: CPT | Performed by: RADIOLOGY

## 2021-11-03 PROCEDURE — 77066 DX MAMMO INCL CAD BI: CPT | Performed by: RADIOLOGY

## 2021-11-03 PROCEDURE — 76641 ULTRASOUND BREAST COMPLETE: CPT | Performed by: RADIOLOGY

## 2021-11-03 PROCEDURE — G0279 TOMOSYNTHESIS, MAMMO: HCPCS | Performed by: RADIOLOGY

## 2021-11-11 ENCOUNTER — OFFICE VISIT (OUTPATIENT)
Dept: ORTHOPEDIC SURGERY | Facility: CLINIC | Age: 46
End: 2021-11-11

## 2021-11-11 VITALS — WEIGHT: 138 LBS | HEIGHT: 62 IN | BODY MASS INDEX: 25.4 KG/M2

## 2021-11-11 DIAGNOSIS — M25.562 ACUTE PAIN OF LEFT KNEE: ICD-10-CM

## 2021-11-11 DIAGNOSIS — S86.912A STRAIN OF LEFT KNEE, INITIAL ENCOUNTER: Primary | ICD-10-CM

## 2021-11-11 PROCEDURE — 99213 OFFICE O/P EST LOW 20 MIN: CPT | Performed by: ORTHOPAEDIC SURGERY

## 2021-11-11 NOTE — PROGRESS NOTES
Subjective:  Left knee pain   Patient ID: Samantha Evans is a 46 y.o. female.    Chief Complaint:    History of Present Illness patient returns has noted improvement following the injection but still having persistent posterior lateral pain when she squats.  Has been taking the anti-inflammatory and tolerating well the posterior lateral pain is not resolved.  She is ambulated without the knee immobilizer.       Social History     Occupational History   • Not on file   Tobacco Use   • Smoking status: Current Some Day Smoker   • Smokeless tobacco: Never Used   • Tobacco comment: 1 PP MONTH   Vaping Use   • Vaping Use: Never used   Substance and Sexual Activity   • Alcohol use: No   • Drug use: No   • Sexual activity: Defer      Review of Systems   Constitutional: Negative for chills, diaphoresis, fever and unexpected weight change.   HENT: Negative for hearing loss, nosebleeds, sore throat and tinnitus.    Eyes: Negative for pain and visual disturbance.   Respiratory: Negative for cough, shortness of breath and wheezing.    Cardiovascular: Negative for chest pain and palpitations.   Gastrointestinal: Negative for abdominal pain, diarrhea, nausea and vomiting.   Endocrine: Negative for cold intolerance, heat intolerance and polydipsia.   Genitourinary: Negative for difficulty urinating, dysuria and hematuria.   Musculoskeletal: Positive for arthralgias and myalgias. Negative for joint swelling.   Skin: Negative for rash and wound.   Allergic/Immunologic: Negative for environmental allergies.   Neurological: Negative for dizziness, syncope and numbness.   Hematological: Does not bruise/bleed easily.   Psychiatric/Behavioral: Negative for dysphoric mood and sleep disturbance. The patient is not nervous/anxious.          Past Medical History:   Diagnosis Date   • Adenomyosis    • Menstrual irregularity    • Pelvic pain      Past Surgical History:   Procedure Laterality Date   •  SECTION   &    •  ENDOMETRIAL ABLATION     • HYSTERECTOMY     • [place] LAP, RADICAL HYST W/ TUBE & OV, NODE BX Bilateral 4/5/2016    Procedure: TOTAL LAPAROSCOPIC HYSTERECTOMY AND MERON SALPINGECTOMY;  Surgeon: Janki Lowry MD;  Location: [facility];  Service: Gynecology     No family history on file.      Objective:  There were no vitals filed for this visit.      11/11/21  1547   Weight: 62.6 kg (138 lb)     Body mass index is 25.24 kg/m².        Ortho Exam   She is alert and oriented x3.  The left knee shows no swelling effusion erythema she now has 0 to 125 degrees of motion but she is having posterior lateral pain with flexion.  Quad hamstring function is 5/5.  Mild lateral joint line tenderness no tenderness medially.  No instability 0 90 degrees nor any varus or valgus instability at 10 to 30 degrees.  Calf nontender.  Distal pulses no motor or sensory deficit.  Skin is cool to touch.  Tolerating the meloxicam    Assessment:        1. Strain of left knee, initial encounter    2. Acute pain of left knee           Plan: Patient is improved but having persistent posterior lateral pain with flexion I want proceed with an MRI to rule out a lateral meniscal tear involving the posterior horn.  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  Return to see me with results of the MRI.  Continue the anti-inflammatory.  Answered all questions            Work Status:    ANGELIKA query complete.    Orders:  No orders of the defined types were placed in this encounter.      Medications:  No orders of the defined types were placed in this encounter.      Followup:  Return in about 18 days (around 11/29/2021).          Dictated utilizing Dragon dictation

## 2021-11-18 ENCOUNTER — APPOINTMENT (OUTPATIENT)
Dept: WOMENS IMAGING | Facility: HOSPITAL | Age: 46
End: 2021-11-18

## 2021-11-18 PROCEDURE — 19001 PUNCTURE ASPIR CYST BRST EA: CPT | Performed by: RADIOLOGY

## 2021-11-18 PROCEDURE — 19000 PUNCTURE ASPIR CYST BREAST: CPT | Performed by: RADIOLOGY

## 2021-12-03 ENCOUNTER — HOSPITAL ENCOUNTER (OUTPATIENT)
Dept: MRI IMAGING | Facility: HOSPITAL | Age: 46
Discharge: HOME | End: 2021-12-03
Admitting: ORTHOPAEDIC SURGERY

## 2021-12-03 DIAGNOSIS — S86.912A STRAIN OF LEFT KNEE, INITIAL ENCOUNTER: ICD-10-CM

## 2021-12-03 DIAGNOSIS — M25.562 ACUTE PAIN OF LEFT KNEE: ICD-10-CM

## 2021-12-03 PROCEDURE — 73721 MRI JNT OF LWR EXTRE W/O DYE: CPT

## 2021-12-09 ENCOUNTER — OFFICE VISIT (OUTPATIENT)
Dept: ORTHOPEDIC SURGERY | Facility: CLINIC | Age: 46
End: 2021-12-09

## 2021-12-09 VITALS — BODY MASS INDEX: 24.84 KG/M2 | HEIGHT: 62 IN | WEIGHT: 135 LBS

## 2021-12-09 DIAGNOSIS — S83.522A RUPTURE OF POSTERIOR CRUCIATE LIGAMENT OF LEFT KNEE, INITIAL ENCOUNTER: Primary | ICD-10-CM

## 2021-12-09 PROCEDURE — 99213 OFFICE O/P EST LOW 20 MIN: CPT | Performed by: ORTHOPAEDIC SURGERY

## 2021-12-09 NOTE — PROGRESS NOTES
Subjective: Left knee pain     Patient ID: Samantha Evans is a 46 y.o. female.    Chief Complaint:    History of Present Illness patient returns with results of the MRI his images and report are personally reviewed.  Does show a high-grade complete versus near complete tear mid substance of the PCL.  No other abnormality noted.  Patient states with normal walking other activities she is asymptomatic which tends to kneel or do any bending type movement or activity as she would with work over the [employer] it is symptomatic and tends to give way.       Social History     Occupational History   • Not on file   Tobacco Use   • Smoking status: Current Some Day Smoker   • Smokeless tobacco: Never Used   • Tobacco comment: 1 PP MONTH   Vaping Use   • Vaping Use: Never used   Substance and Sexual Activity   • Alcohol use: No   • Drug use: No   • Sexual activity: Defer      Review of Systems   Constitutional: Negative for chills, diaphoresis, fever and unexpected weight change.   HENT: Negative for hearing loss, nosebleeds, sore throat and tinnitus.    Eyes: Negative for pain and visual disturbance.   Respiratory: Negative for cough, shortness of breath and wheezing.    Cardiovascular: Negative for chest pain and palpitations.   Gastrointestinal: Negative for abdominal pain, diarrhea, nausea and vomiting.   Endocrine: Negative for cold intolerance, heat intolerance and polydipsia.   Genitourinary: Negative for difficulty urinating, dysuria and hematuria.   Musculoskeletal: Positive for arthralgias and myalgias. Negative for joint swelling.   Skin: Negative for rash and wound.   Allergic/Immunologic: Negative for environmental allergies.   Neurological: Negative for dizziness, syncope and numbness.   Hematological: Does not bruise/bleed easily.   Psychiatric/Behavioral: Negative for dysphoric mood and sleep disturbance. The patient is not nervous/anxious.          Past Medical History:   Diagnosis Date   • Adenomyosis    •  Menstrual irregularity    • Pelvic pain      Past Surgical History:   Procedure Laterality Date   •  SECTION   &    • ENDOMETRIAL ABLATION     • HYSTERECTOMY     • [place] LAP, RADICAL HYST W/ TUBE & OV, NODE BX Bilateral 2016    Procedure: TOTAL LAPAROSCOPIC HYSTERECTOMY AND MERON SALPINGECTOMY;  Surgeon: Janki Lowry MD;  Location: [facility];  Service: Gynecology     History reviewed. No pertinent family history.      Objective:  There were no vitals filed for this visit.      21  1500   Weight: 61.2 kg (135 lb)     Body mass index is 24.69 kg/m².        Ortho Exam   She is alert and oriented x3.  The knee shows no swelling effusion erythema she has 0 to 125 degrees of motion.  No motor or sensory deficit.  Quad and hamstring function 5/5.  She does have at 90 degrees posterior translation +1 to +2.  No varus or valgus instability.  No joint line tenderness.  Her calf is nontender.  Good is a pulses no motor or sensory deficit.      MRI Knee LT WO     INDICATION:    Anterior and posterior left knee pain after slipping on wet floor 2021. No reported prior left knee surgery.     TECHNIQUE:   MRI of the left knee without IV contrast.     COMPARISON:   Left knee x-rays 2021     FINDINGS:   Menisci:     The menisci are intact.     Ligaments:   Cruciate ligaments:  There is abnormal signal and irregularity involving the mid substance of the posterior cruciate ligament, consistent with high-grade complete versus near complete rupture. PCL orientation is otherwise within normal limits. No  abnormal tibial translation as the knee is positioned in the scanner. ACL intact.     Medial collateral ligament complex:  Intact.     Lateral collateral ligament complex:  Intact.     Extensor mechanism: Intact.     Fluid: No joint effusion. No popliteal cyst.     Articular cartilage:     Patellofemoral compartment:  No hyaline cartilage disease.     Medial compartment:  No hyaline cartilage  disease.     Lateral compartment: No hyaline cartilage disease.     Osseous structures and musculature:    No fracture, stress reaction, or osseous lesion. Bone marrow signal is within normal limits. Visualized musculature is within normal limits.     IMPRESSION:     1. High-grade complete versus near complete rupture of the mid substance of the PCL. No abnormal tibial translation as the knee is positioned in the scanner.  2. Menisci, ACL, and collateral ligaments are intact.  3. No acute osseous injury or evidence of significant articular cartilage loss.           Signer Name: Bud Joe MD   Signed: 12/6/2021 9:33 AM   Workstation Name: [email or web address]    [facility]      Assessment:        1. Rupture of posterior cruciate ligament of left knee, initial encounter           Plan: Reviewed the results of the MRI with the patient.  She still symptomatic with certain activities and has a sensation of the knee pain and giving out particularly with kneeling or crawling or bending.  I am going refer to Dr. Lavell Barriga for his evaluation to determine if he feels surgical intervention is indicated or if he needs extensive rehab.  Off work until seen by him.  Answered all questions            Work Status:    ANGELIKA query complete.    Orders:  No orders of the defined types were placed in this encounter.      Medications:  No orders of the defined types were placed in this encounter.      Followup:  Return in about 3 weeks (around 12/30/2021).          Dictated utilizing Dragon dictation

## 2021-12-14 ENCOUNTER — TELEPHONE (OUTPATIENT)
Dept: ORTHOPEDIC SURGERY | Facility: CLINIC | Age: 46
End: 2021-12-14

## 2021-12-14 NOTE — TELEPHONE ENCOUNTER
Caller: BELA    Relationship: [employer]    Best call back number:[phone]    SHE NEEDS PATIENTS WORK STATUS  FROM OFFICE NOTES ON 12/9/21

## 2021-12-15 ENCOUNTER — TELEPHONE (OUTPATIENT)
Dept: ORTHOPEDIC SURGERY | Facility: CLINIC | Age: 46
End: 2021-12-15

## 2021-12-15 NOTE — TELEPHONE ENCOUNTER
called patient's work comp back, there was no fax number left in message--therefore I could not fax work status. Also was unable to lvm for Jessica, she seems to be out of the office.

## 2022-01-06 ENCOUNTER — OFFICE VISIT (OUTPATIENT)
Dept: ORTHOPEDIC SURGERY | Facility: CLINIC | Age: 47
End: 2022-01-06

## 2022-01-06 VITALS
BODY MASS INDEX: 25.03 KG/M2 | HEART RATE: 73 BPM | DIASTOLIC BLOOD PRESSURE: 62 MMHG | HEIGHT: 62 IN | WEIGHT: 136 LBS | SYSTOLIC BLOOD PRESSURE: 114 MMHG

## 2022-01-06 DIAGNOSIS — S83.522A RUPTURE OF POSTERIOR CRUCIATE LIGAMENT OF LEFT KNEE, INITIAL ENCOUNTER: Primary | ICD-10-CM

## 2022-01-06 PROCEDURE — 99214 OFFICE O/P EST MOD 30 MIN: CPT | Performed by: ORTHOPAEDIC SURGERY

## 2022-01-06 RX ORDER — PREDNISONE 10 MG/1
TABLET ORAL
Qty: 39 TABLET | Refills: 0 | Status: SHIPPED | OUTPATIENT
Start: 2022-01-06 | End: 2022-02-21

## 2022-01-06 RX ORDER — CYCLOBENZAPRINE HCL 5 MG
5 TABLET ORAL 3 TIMES DAILY PRN
Qty: 45 TABLET | Refills: 0 | Status: SHIPPED | OUTPATIENT
Start: 2022-01-06 | End: 2022-02-21

## 2022-01-06 NOTE — PROGRESS NOTES
"The patient has consented to being recorded using [employer].    Subjective:     Patient ID: Samantha Evans is a 46 y.o. female.    Chief Complaint:  Left knee pain and instability, new patient to examiner  Pain  Associated symptoms include arthralgias and myalgias. Pertinent negatives include no abdominal pain, chest pain, chills, coughing, diaphoresis, fever, joint swelling, nausea, numbness, rash, sore throat or vomiting.     Samantha Evans presents to clinic today for evaluation of left knee pain.    The patient notes an injury that occurred on 09/21/2021, at which time she fell. She reports at the time of the injury she noticed significant swelling and pain. She admits to seeking medical treatment the time of her injury at the emergency room, at which time she was provided an immobilizer brace. Since than, she has been evaluated by Dr. Louise. She admits to obtaining an MRI, injections, and previously attending physical therapy; however, they provided no relief.     She denies any current swelling; however still experiencing pain that she rates 8 out of 10. The patient describes stabbing, sharp, shooting, and throbbing in nature, localized primarily to the posterior aspect of the knee, with mild medial joint line pain. She notes buckling and instability episodes, that have been intensifying. The patient reports her pain has been affecting her daily activities such as, climbing out of the bathtub, shaving, and taking off her shoes. She notes pain with weightbearing on the on the anterior proximal tibia. She notes mild numbness, tingling, and pain that radiates distally into her lower extremities.     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 She denies any history of broken bones. The patient notes she was always able to \"walk it off\" with a sprained ankle; however the pain in her knee was to severe and \"different\" compared to her previous injuries.      Social History " "    Occupational History   • Not on file   Tobacco Use   • Smoking status: Current Some Day Smoker   • Smokeless tobacco: Never Used   • Tobacco comment: 1 PP MONTH   Vaping Use   • Vaping Use: Never used   Substance and Sexual Activity   • Alcohol use: No   • Drug use: No   • Sexual activity: Defer      Past Medical History:   Diagnosis Date   • Adenomyosis    • Menstrual irregularity    • Pelvic pain      Past Surgical History:   Procedure Laterality Date   •  SECTION   &    • ENDOMETRIAL ABLATION     • HYSTERECTOMY     • [place] LAP, RADICAL HYST W/ TUBE & OV, NODE BX Bilateral 2016    Procedure: TOTAL LAPAROSCOPIC HYSTERECTOMY AND MERON SALPINGECTOMY;  Surgeon: Janki Lowry MD;  Location: [facility];  Service: Gynecology       History reviewed. No pertinent family history.      Review of Systems   Constitutional: Negative for chills, diaphoresis, fever and unexpected weight change.   HENT: Negative for hearing loss, nosebleeds, sneezing and sore throat.    Eyes: Negative for pain and visual disturbance.   Respiratory: Negative for cough, shortness of breath and wheezing.    Cardiovascular: Negative for chest pain and palpitations.   Gastrointestinal: Negative for abdominal pain, diarrhea, nausea and vomiting.   Endocrine: Negative for cold intolerance, heat intolerance and polydipsia.   Genitourinary: Negative for difficulty urinating, dyspareunia and hematuria.   Musculoskeletal: Positive for arthralgias and myalgias. Negative for joint swelling.   Skin: Negative for rash and wound.   Allergic/Immunologic: Negative for environmental allergies.   Neurological: Negative for dizziness, syncope and numbness.   Hematological: Does not bruise/bleed easily.   Psychiatric/Behavioral: Negative for dysphoric mood and sleep disturbance. The patient is not nervous/anxious.            Objective:  Vitals:    22 0815   BP: 114/62   Pulse: 73   Weight: 61.7 kg (136 lb)   Height: 157.5 cm (62\")      "    01/06/22  0815   Weight: 61.7 kg (136 lb)     Body mass index is 24.87 kg/m².  Physical Exam    Vital signs reviewed.   General: No acute distress, alert and oriented  Eyes: conjunctiva clear; pupils equally round and reactive  ENT: external ears and nose atraumatic; oropharynx clear  CV: no peripheral edema  Resp: normal respiratory effort  Skin: no rashes or wounds; normal turgor  Psych: mood and affect appropriate; recent and remote memory intact          Ortho Exam     Left Knee-     ROM 0 to 125 degrees, compared to 0 to 135 degrees on the right.   4+/5 on flexion  4+/5 on extension  Moderate tenderness medial joint line.  No extensor lag.   Effusion- Minimal  Posterior drawer- grade 2/3  Diaz sign- Positive  Dial test- negative  Stable opening on varus and valgus stress at 0 and 30  Patellar compression test- Negative  Log roll-  negative  Stinchfield-  negative  J-sign- Midline patellar tracking  Positive sensation light touch all distributions symmetric to contralateral side  Brisk cap refill all digits  2+ dorsalis pedis pulse  Imaging:    MRI Knee LT WO    INDICATION:   Anterior and posterior left knee pain after slipping on wet floor September 2021. No reported prior left knee surgery.    TECHNIQUE:   MRI of the left knee without IV contrast.    COMPARISON:   Left knee x-rays 9/21/2021    FINDINGS:   Menisci:  The menisci are intact.    Ligaments:   Cruciate ligaments: There is abnormal signal and irregularity involving the mid substance of the posterior cruciate ligament, consistent with high-grade complete versus near complete rupture. PCL orientation is otherwise within normal limits. No  abnormal tibial translation as the knee is positioned in the scanner. ACL intact.  Medial collateral ligament complex: Intact.  Lateral collateral ligament complex: Intact.    Extensor mechanism: Intact.    Fluid: No joint effusion. No popliteal cyst.    Articular cartilage:  Patellofemoral compartment: No  hyaline cartilage disease.  Medial compartment: No hyaline cartilage disease.  Lateral compartment: No hyaline cartilage disease.    Osseous structures and musculature:   No fracture, stress reaction, or osseous lesion. Bone marrow signal is within normal limits. Visualized musculature is within normal limits.   Report Conclusions  IMPRESSION:     1. High-grade complete versus near complete rupture of the mid substance of the PCL. No abnormal tibial translation as the knee is positioned in the scanner.  2. Menisci, ACL, and collateral ligaments are intact.  3. No acute osseous injury or evidence of significant articular cartilage loss.       Review of outside x-rays and MRI left knee including review of images as well as radiology report indicates high-grade versus near complete rupture PCL, ACL collateral ligaments appear to be grossly intact.  No evidence of focal chondral defect.    Review of outside x-ray left knee indicate no evidence of fracture, dislocation, or subluxation.  Assessment:        1. Rupture of posterior cruciate ligament of left knee, initial encounter           Plan:          1. Discussed treatment options at length with patient at today's visit.  We discussed option for PCL reconstruction with surgical treatment as well as nonoperative treatment with physical therapy and bracing.  At this point time patient wants to hold off on surgical treatment and try to proceed with maximal nonoperative treatment with referral for therapy and custom PCL brace for left knee  2. Patient will be contacted for a fitted brace.  Custom brace is necessary in order to provide intimate fit needed for appropriate ligamentous support for PCL tear and given thigh calf asymmetry and insufficient muscle mass of the quad on which to suspend an orthosis.  3. Referral for physical therapy to work on range of motion and strength.  4. Flexeril sent to patients pharmacy for residual pain.  5. Follow up: 6 weeks, with repeat  x-rays of the left knee with PCL stress views.       Samantha Evans was in agreement with plan and had all questions answered.     Orders:  Orders Placed This Encounter   Procedures   •  Custome Knee Orthosis   • Ambulatory Referral to Physical Therapy Evaluate and treat, Ortho       Medications:  New Medications Ordered This Visit   Medications   • predniSONE (DELTASONE) 10 MG tablet     Si mg po daily x 3 days, then 40 mg po daily x 3 days, then 20 mg po daily x 3 days, then 10 mg po daily x 3 days     Dispense:  39 tablet     Refill:  0   • cyclobenzaprine (FLEXERIL) 5 MG tablet     Sig: Take 1 tablet by mouth 3 (Three) Times a Day As Needed for Muscle Spasms.     Dispense:  45 tablet     Refill:  0       Followup:  Return in about 6 weeks (around 2022).    Diagnoses and all orders for this visit:    1. Rupture of posterior cruciate ligament of left knee, initial encounter (Primary)  -     Ambulatory Referral to Physical Therapy Evaluate and treat, Ortho  -      Custome Knee Orthosis    Other orders  -     predniSONE (DELTASONE) 10 MG tablet; 60 mg po daily x 3 days, then 40 mg po daily x 3 days, then 20 mg po daily x 3 days, then 10 mg po daily x 3 days  Dispense: 39 tablet; Refill: 0  -     cyclobenzaprine (FLEXERIL) 5 MG tablet; Take 1 tablet by mouth 3 (Three) Times a Day As Needed for Muscle Spasms.  Dispense: 45 tablet; Refill: 0          Dictated utilizing Dragon dictation   Scribed for Lavell Barriga MD by Jenise Rodriguez.  2022  11:05 EST

## 2022-01-14 ENCOUNTER — HOSPITAL ENCOUNTER (OUTPATIENT)
Dept: PHYSICAL THERAPY | Facility: HOSPITAL | Age: 47
Setting detail: THERAPIES SERIES
Discharge: HOME | End: 2022-01-14

## 2022-01-14 DIAGNOSIS — S86.912A STRAIN OF LEFT KNEE, INITIAL ENCOUNTER: Primary | ICD-10-CM

## 2022-01-14 PROCEDURE — 97161 PT EVAL LOW COMPLEX 20 MIN: CPT

## 2022-01-14 NOTE — THERAPY EVALUATION
Outpatient Physical Therapy Ortho Initial Evaluation   [facility]     Patient Name: Samantha Evans  : 1975  MRN: [id number]  Today's Date: 2022      Visit Date: 2022    Patient Active Problem List   Diagnosis   • Pelvic pain        Past Medical History:   Diagnosis Date   • Adenomyosis    • Menstrual irregularity    • Pelvic pain         Past Surgical History:   Procedure Laterality Date   •  SECTION   &    • ENDOMETRIAL ABLATION     • HYSTERECTOMY     • [place] LAP, RADICAL HYST W/ TUBE & OV, NODE BX Bilateral 2016    Procedure: TOTAL LAPAROSCOPIC HYSTERECTOMY AND MERON SALPINGECTOMY;  Surgeon: Janki Lowry MD;  Location: [facility] MAIN OR;  Service: Gynecology       Visit Dx:     ICD-10-CM ICD-9-CM   1. Strain of left knee, initial encounter  S86.912A 844.9              PT Ortho     Row Name 22 0600       Precautions and Contraindications    Precautions/Limitations no known precautions/limitations  -AS       Posture/Observations    Posture- WNL Posture is WNL  -AS       Patellar Accessory Motions    Superior glide Left:; WNL  -AS    Inferior glide Left:; WNL  -AS    Medial glide Left:; WNL  -AS    Lateral glide Left:; WNL  -AS       Knee Special Tests    Posterior drawer (PCL lesion) Left:; Positive  -AS       Left Lower Ext    Lt Knee Extension/Flexion AROM 0-135 degrees  -AS       MMT Left Lower Ext    Lt Hip Flexion MMT, Gross Movement (4/5) good  -AS    Lt Hip Extension MMT, Gross Movement (4/5) good  -AS    Lt Hip ABduction MMT, Gross Movement (4/5) good  -AS    Lt Knee Extension MMT, Gross Movement (4/5) good  -AS    Lt Knee Flexion MMT, Gross Movement (4/5) good  -AS       Sensation    Sensation WNL? WNL  -AS    Light Touch No apparent deficits  -AS       Lower Extremity Flexibility    Hamstrings Left:; WNL  -AS          User Key  (r) = Recorded By, (t) = Taken By, (c) = Cosigned By    Initials Name Provider Type    AS Bry Sargent, PT Physical Therapist                             Therapy Education  Given: HEP  Program: New, Reinforced  How Provided: Verbal, Demonstration, Written  Provided to: Patient  Level of Understanding: Teach back education performed, Verbalized, Demonstrated      PT OP Goals     Row Name 01/14/22 0600          PT Short Term Goals    STG 1 Patient to demonstrate compliance with her initial HEP for flexibility, ROM and strengthening.  -AS     STG 2 Patient to report left knee pain on VAS of 3-4/10 at worst with activity.  -AS     STG 3 Patient to demonstrate improved left knee and RLE strength to 4+/5 in all planes.  -AS            Long Term Goals    LTG 1 Patient to demonstrate compliance with her advanced HEP for flexibility, ROM and strengthening.  -AS     LTG 2 Patient to report left knee pain on VAS of 0-1/10 at worst with activity.  -AS     LTG 3 Patient to demonstrate improved left knee and RLE strength to 5/5 in all planes.  -AS     LTG 4 Patient to report overall improved function on LEFS to 70/80.  -AS     LTG 5 --  -AS           User Key  (r) = Recorded By, (t) = Taken By, (c) = Cosigned By    Initials Name Provider Type    AS Bry Sargent, PT Physical Therapist                 PT Assessment/Plan     Row Name 01/14/22 0600          PT Assessment    Functional Limitations Limitation in home management; Limitations in community activities; Performance in sport activities; Performance in work activities  -AS     Impairments Muscle strength; Pain  -AS     Assessment Comments Patient presents to outpatient PT with an MRI confirmed diagnosis of a torn PCL in her left knee. Patient has good left knee ROM of 0-135 degrees, slight limited left knee and hip strength, and increased pain with certain movements and activities.  -AS     Please refer to paper survey for additional self-reported information Yes  -AS     Rehab Potential Good  -AS     Patient/caregiver participated in establishment of treatment plan and goals Yes  -AS      Patient would benefit from skilled therapy intervention Yes  -AS            PT Plan    PT Frequency 1x/week  -AS     Predicted Duration of Therapy Intervention (PT) 3-4 weeks  -AS     Planned CPT's? PT RE-EVAL: 57605; PT THER PROC EA 15 MIN: 65496; PT THER ACT EA 15 MIN: 86345; PT MANUAL THERAPY EA 15 MIN: 50788; PT NEUROMUSC RE-EDUCATION EA 15 MIN: 84386  -AS     PT Plan Comments Continue with current treatment plan.  -AS           User Key  (r) = Recorded By, (t) = Taken By, (c) = Cosigned By    Initials Name Provider Type    AS Bry Sargent, PT Physical Therapist                   OP Exercises     Row Name 01/14/22 0600             Subjective Comments    Subjective Comments Patient states she had an MRI and has a torn PCL. She states she is to attempt PT, medication, and knee brace and surgery is a last resort.  -AS              Exercise 1    Exercise Name 1 Heel Slides  -AS              Exercise 2    Exercise Name 2 HS Stretch  -AS              Exercise 3    Exercise Name 3 QS  -AS      Reps 3 25  -AS      Time 3 5 sec hold each  -AS              Exercise 4    Exercise Name 4 4-Way Hip  -AS      Reps 4 25 each  -AS      Time 4 1# each  -AS              Exercise 5    Exercise Name 5 SAQ Ball Squeeze  -AS      Reps 5 25  -AS      Time 5 1#  -AS              Exercise 6    Exercise Name 6 LAQ Ball Squeeze  -AS      Reps 6 25  -AS      Time 6 1#  -AS              Exercise 7    Exercise Name 7 TKE  -AS      Reps 7 25  -AS      Time 7 Gold  -AS              Exercise 8    Exercise Name 8 Mini Squats  -AS      Reps 8 25  -AS              Exercise 9    Exercise Name 9 Lateral Dips - 4 inch step  -AS      Reps 9 25  -AS              Exercise 10    Exercise Name 10 Prone Quad Stretch  -AS              Exercise 11    Exercise Name 11 HS vs Stool  -AS              Exercise 12    Exercise Name 12 Seated HS Curl vs Band  -AS              Exercise 13    Exercise Name 13 Standing HS Curl  -AS              Exercise 14     Exercise Name 14 Supine Clams  -AS      Reps 14 25  -AS      Time 14 Gold  -AS              Exercise 15    Exercise Name 15 Bridge vs Band  -AS      Reps 15 25  -AS      Time 15 Gold  -AS            User Key  (r) = Recorded By, (t) = Taken By, (c) = Cosigned By    Initials Name Provider Type    AS Bry Sargent, PT Physical Therapist                              Outcome Measure Options: Lower Extremity Functional Scale (LEFS)  Lower Extremity Functional Index  Any of your usual work, housework or school activities: A little bit of difficulty  Your usual hobbies, recreational or sporting activities: Moderate difficulty  Getting into or out of the bath: A little bit of difficulty  Walking between rooms: No difficulty  Putting on your shoes or socks: A little bit of difficulty  Squatting: Quite a bit of difficulty  Lifting an object, like a bag of groceries from the floor: A little bit of difficulty  Performing light activities around your home: A little bit of difficulty  Performing heavy activities around your home: Quite a bit of difficulty  Getting into or out of a car: A little bit of difficulty  Walking 2 blocks: No difficulty  Walking a mile: No difficulty  Going up or down 10 stairs (about 1 flight of stairs): Quite a bit of difficulty  Standing for 1 hour: No difficulty  Sitting for 1 hour: A little bit of difficulty  Running on even ground: Extreme difficulty or unable to perform activity  Running on uneven ground: Extreme difficulty or unable to perform activity  Making sharp turns while running fast: Extreme difficulty or unable to perform activity  Hopping: Extreme difficulty or unable to perform activity  Rolling over in bed: A little bit of difficulty  Total: 45      Time Calculation:     Start Time: 0545  Stop Time: 0630  Time Calculation (min): 45 min     Therapy Charges for Today     Code Description Service Date Service Provider Modifiers Qty    57317647477  PT EVAL LOW COMPLEXITY 3  1/14/2022 Bry Sargent, PT GP 1          PT G-Codes  Outcome Measure Options: Lower Extremity Functional Scale (LEFS)  Total: 45         Bry Sargent, PT  1/14/2022

## 2022-01-28 ENCOUNTER — HOSPITAL ENCOUNTER (OUTPATIENT)
Dept: PHYSICAL THERAPY | Facility: HOSPITAL | Age: 47
Setting detail: THERAPIES SERIES
Discharge: HOME | End: 2022-01-28

## 2022-01-28 DIAGNOSIS — S86.912A STRAIN OF LEFT KNEE, INITIAL ENCOUNTER: Primary | ICD-10-CM

## 2022-01-28 PROCEDURE — 97110 THERAPEUTIC EXERCISES: CPT

## 2022-01-28 NOTE — THERAPY TREATMENT NOTE
"    Outpatient Physical Therapy Ortho Treatment Note   [place]     Patient Name: Samantha Evans  : 1975  MRN: [id number]  Today's Date: 2022      Visit Date: 2022    Visit Dx:    ICD-10-CM ICD-9-CM   1. Strain of left knee, initial encounter  S86.912A 844.9       Patient Active Problem List   Diagnosis   • Pelvic pain        Past Medical History:   Diagnosis Date   • Adenomyosis    • Menstrual irregularity    • Pelvic pain         Past Surgical History:   Procedure Laterality Date   •  SECTION   &    • ENDOMETRIAL ABLATION     • HYSTERECTOMY     • [place] LAP, RADICAL HYST W/ TUBE & OV, NODE BX Bilateral 2016    Procedure: TOTAL LAPAROSCOPIC HYSTERECTOMY AND MERON SALPINGECTOMY;  Surgeon: Janki Lowry MD;  Location: [facility];  Service: Gynecology                        PT Assessment/Plan     Row Name 22 1300          PT Assessment    Assessment Comments Progressed patient with strengthening exercises today. Patient tolerated her treatment session well today.  -AS            PT Plan    PT Plan Comments Continue with current treatment plan.  -AS           User Key  (r) = Recorded By, (t) = Taken By, (c) = Cosigned By    Initials Name Provider Type    AS Bry Sargent, PT Physical Therapist                   OP Exercises     Row Name 22 1300             Subjective Comments    Subjective Comments Patient states her knee is \"actually feeling a little bit better\". She states she feels it is a combination of medication, exercises, and her knee brace.  -AS              Exercise 1    Exercise Name 1 Heel Slides  -AS              Exercise 2    Exercise Name 2 HS Stretch  -AS              Exercise 3    Exercise Name 3 QS  -AS      Reps 3 25  -AS      Time 3 5 sec hold each  -AS              Exercise 4    Exercise Name 4 4-Way Hip  -AS      Reps 4 25 each  -AS      Time 4 2# each  -AS              Exercise 5    Exercise Name 5 SAQ Ball Squeeze  -AS      Reps 5 40 "  -AS      Time 5 2#  -AS              Exercise 6    Exercise Name 6 LAQ Ball Squeeze  -AS      Reps 6 40  -AS      Time 6 2#  -AS              Exercise 7    Exercise Name 7 TKE  -AS      Reps 7 40  -AS      Time 7 Gold  -AS              Exercise 8    Exercise Name 8 Mini Squats  -AS      Reps 8 40  -AS              Exercise 9    Exercise Name 9 Lateral Dips - 4 inch step  -AS      Reps 9 25  -AS              Exercise 10    Exercise Name 10 Prone Quad Stretch  -AS              Exercise 11    Exercise Name 11 HS vs Stool  -AS              Exercise 12    Exercise Name 12 Seated HS Curl vs Band  -AS              Exercise 13    Exercise Name 13 Standing HS Curl  -AS              Exercise 14    Exercise Name 14 Supine Clams  -AS      Reps 14 40  -AS      Time 14 Gold  -AS              Exercise 15    Exercise Name 15 Bridge vs Band  -AS      Reps 15 40  -AS      Time 15 Gold  -AS              Exercise 16    Exercise Name 16 3-Way Cable Walks  -AS      Reps 16 5x each  -AS      Time 16 20#  -AS            User Key  (r) = Recorded By, (t) = Taken By, (c) = Cosigned By    Initials Name Provider Type    AS Bry Sargent, PT Physical Therapist                                                Time Calculation:   Start Time: 1304  Stop Time: 1337  Time Calculation (min): 33 min  Therapy Charges for Today     Code Description Service Date Service Provider Modifiers Qty    96986561859  PT THER PROC EA 15 MIN 1/28/2022 Bry Sargent, PT GP 2                    Bry Sargent, PT  1/28/2022

## 2022-02-08 ENCOUNTER — HOSPITAL ENCOUNTER (OUTPATIENT)
Dept: PHYSICAL THERAPY | Facility: HOSPITAL | Age: 47
Setting detail: THERAPIES SERIES
Discharge: HOME | End: 2022-02-08

## 2022-02-08 DIAGNOSIS — S86.912A STRAIN OF LEFT KNEE, INITIAL ENCOUNTER: Primary | ICD-10-CM

## 2022-02-08 PROCEDURE — 97110 THERAPEUTIC EXERCISES: CPT

## 2022-02-08 NOTE — THERAPY TREATMENT NOTE
"    Outpatient Physical Therapy Ortho Treatment Note   July Stauffer     Patient Name: Samantha Evans  : 1975  MRN: [id number]  Today's Date: 2022      Visit Date: 2022    Visit Dx:    ICD-10-CM ICD-9-CM   1. Strain of left knee, initial encounter  S86.912A 844.9       Patient Active Problem List   Diagnosis   • Pelvic pain        Past Medical History:   Diagnosis Date   • Adenomyosis    • Menstrual irregularity    • Pelvic pain         Past Surgical History:   Procedure Laterality Date   •  SECTION   &    • ENDOMETRIAL ABLATION     • HYSTERECTOMY     • [place] LAP, RADICAL HYST W/ TUBE & OV, NODE BX Bilateral 2016    Procedure: TOTAL LAPAROSCOPIC HYSTERECTOMY AND MERON SALPINGECTOMY;  Surgeon: Janki Lowry MD;  Location: [facility];  Service: Gynecology                        PT Assessment/Plan     Row Name 22 0500          PT Assessment    Assessment Comments Patient continues to do well with PT, however she continues to report pain and difficulty performing certain activities.  -AS            PT Plan    PT Plan Comments Continue with current treatment plan.  -AS           User Key  (r) = Recorded By, (t) = Taken By, (c) = Cosigned By    Initials Name Provider Type    AS Bry Sargent, PT Physical Therapist                   OP Exercises     Row Name 22 0500             Subjective Comments    Subjective Comments Patient states her knee \"feels about the same\". She states she does continue to have pain and discomfort with certain activities.  -AS              Exercise 1    Exercise Name 1 Heel Slides  -AS              Exercise 2    Exercise Name 2 HS Stretch  -AS      Reps 2 10  -AS      Time 2 10 sec hold each  -AS              Exercise 3    Exercise Name 3 QS  -AS      Reps 3 25  -AS      Time 3 5 sec hold each  -AS              Exercise 4    Exercise Name 4 4-Way Hip  -AS      Reps 4 30 each  -AS      Time 4 2# each  -AS              Exercise 5    Exercise " Name 5 SAQ Ball Squeeze  -AS      Reps 5 40  -AS      Time 5 2#  -AS              Exercise 6    Exercise Name 6 LAQ Ball Squeeze  -AS      Reps 6 40  -AS      Time 6 2#  -AS              Exercise 7    Exercise Name 7 TKE  -AS      Reps 7 40  -AS      Time 7 Gold  -AS              Exercise 8    Exercise Name 8 Mini Squats  -AS      Reps 8 40  -AS              Exercise 9    Exercise Name 9 Lateral Dips - 4 inch step  -AS      Reps 9 25  -AS              Exercise 10    Exercise Name 10 Prone Quad Stretch  -AS              Exercise 11    Exercise Name 11 HS vs Stool  -AS              Exercise 12    Exercise Name 12 Seated HS Curl vs Band  -AS              Exercise 13    Exercise Name 13 Standing HS Curl  -AS              Exercise 14    Exercise Name 14 Supine Clams  -AS      Reps 14 40  -AS      Time 14 Gold  -AS              Exercise 15    Exercise Name 15 Bridge vs Band  -AS      Reps 15 40  -AS      Time 15 Gold  -AS              Exercise 16    Exercise Name 16 3-Way Cable Walks  -AS      Reps 16 5x each  -AS      Time 16 25#  -AS            User Key  (r) = Recorded By, (t) = Taken By, (c) = Cosigned By    Initials Name Provider Type    AS Bry Sargent, PT Physical Therapist                                                Time Calculation:   Start Time: 0554  Stop Time: 0627  Time Calculation (min): 33 min  Therapy Charges for Today     Code Description Service Date Service Provider Modifiers Qty    14237605772  PT THER PROC EA 15 MIN 2/8/2022 Bry Sargent, PT GP 2                    Bry Sargent, PT  2/8/2022

## 2022-02-16 ENCOUNTER — HOSPITAL ENCOUNTER (OUTPATIENT)
Dept: PHYSICAL THERAPY | Facility: HOSPITAL | Age: 47
Setting detail: THERAPIES SERIES
Discharge: HOME | End: 2022-02-16

## 2022-02-16 DIAGNOSIS — S86.912A STRAIN OF LEFT KNEE, INITIAL ENCOUNTER: Primary | ICD-10-CM

## 2022-02-16 PROCEDURE — 97110 THERAPEUTIC EXERCISES: CPT

## 2022-02-16 NOTE — THERAPY TREATMENT NOTE
"    Outpatient Physical Therapy Ortho Treatment Note   July Stauffer     Patient Name: Samantha Evans  : 1975  MRN: [id number]  Today's Date: 2022      Visit Date: 2022    Visit Dx:    ICD-10-CM ICD-9-CM   1. Strain of left knee, initial encounter  S86.912A 844.9       Patient Active Problem List   Diagnosis   • Pelvic pain        Past Medical History:   Diagnosis Date   • Adenomyosis    • Menstrual irregularity    • Pelvic pain         Past Surgical History:   Procedure Laterality Date   •  SECTION   &    • ENDOMETRIAL ABLATION     • HYSTERECTOMY     • [place] LAP, RADICAL HYST W/ TUBE & OV, NODE BX Bilateral 2016    Procedure: TOTAL LAPAROSCOPIC HYSTERECTOMY AND MERON SALPINGECTOMY;  Surgeon: Janki Lowry MD;  Location: [facility];  Service: Gynecology                        PT Assessment/Plan     Row Name 22          PT Assessment    Assessment Comments Patient continues to do well with PT, however she continues to report pain and difficulty performing certain activities.  -AS            PT Plan    PT Plan Comments Continue with current treatment plan.  -AS           User Key  (r) = Recorded By, (t) = Taken By, (c) = Cosigned By    Initials Name Provider Type    AS Bry Sargent, PT Physical Therapist                   OP Exercises     Row Name 22             Subjective Comments    Subjective Comments Patient states her knee \"feels about the same\". She [place] she felt a \"throbbing pain in my knee\". She states this is the first time she has felt this throbbing pain. She states she is scheduled to see her MD next Monday.  -AS              Exercise 1    Exercise Name 1 Heel Slides  -AS              Exercise 2    Exercise Name 2 HS Stretch  -AS      Reps 2 10  -AS      Time 2 10 sec hold each  -AS              Exercise 3    Exercise Name 3 QS  -AS      Reps 3 25  -AS      Time 3 5 sec hold " each  -AS              Exercise 4    Exercise Name 4 4-Way Hip  -AS      Reps 4 25 each  -AS      Time 4 3#  -AS              Exercise 5    Exercise Name 5 SAQ Ball Squeeze  -AS      Reps 5 40  -AS      Time 5 3#  -AS              Exercise 6    Exercise Name 6 LAQ Ball Squeeze  -AS      Reps 6 40  -AS      Time 6 3#  -AS              Exercise 7    Exercise Name 7 TKE  -AS      Reps 7 40  -AS      Time 7 Gold  -AS              Exercise 8    Exercise Name 8 Mini Squats  -AS      Reps 8 40  -AS              Exercise 9    Exercise Name 9 Lateral Dips - 4 inch step  -AS      Reps 9 25  -AS              Exercise 10    Exercise Name 10 Prone Quad Stretch  -AS              Exercise 11    Exercise Name 11 HS vs Stool  -AS              Exercise 12    Exercise Name 12 Seated HS Curl vs Band  -AS              Exercise 13    Exercise Name 13 Standing HS Curl  -AS              Exercise 14    Exercise Name 14 Supine Clams  -AS      Reps 14 40  -AS      Time 14 Gold  -AS              Exercise 15    Exercise Name 15 Bridge vs Band  -AS      Reps 15 40  -AS      Time 15 Gold  -AS              Exercise 16    Exercise Name 16 3-Way Cable Walks  -AS      Reps 16 5x each  -AS      Time 16 30#  -AS            User Key  (r) = Recorded By, (t) = Taken By, (c) = Cosigned By    Initials Name Provider Type    AS Bry Sargent, PT Physical Therapist                                                Time Calculation:   Start Time: 0557  Stop Time: 0626  Time Calculation (min): 29 min  Therapy Charges for Today     Code Description Service Date Service Provider Modifiers Qty    29448041093  PT THER PROC EA 15 MIN 2/16/2022 Bry Sargent, PT GP 2                    Bry Sargent, PT  2/16/2022

## 2022-02-18 ENCOUNTER — HOSPITAL ENCOUNTER (OUTPATIENT)
Dept: PHYSICAL THERAPY | Facility: HOSPITAL | Age: 47
Setting detail: THERAPIES SERIES
Discharge: HOME | End: 2022-02-18

## 2022-02-18 DIAGNOSIS — S86.912A STRAIN OF LEFT KNEE, INITIAL ENCOUNTER: Primary | ICD-10-CM

## 2022-02-18 PROCEDURE — 97110 THERAPEUTIC EXERCISES: CPT

## 2022-02-18 NOTE — THERAPY TREATMENT NOTE
"    Outpatient Physical Therapy Ortho Treatment Note   July Stauffer     Patient Name: Samantha Evans  : 1975  MRN: [id number]  Today's Date: 2022      Visit Date: 2022    Visit Dx:    ICD-10-CM ICD-9-CM   1. Strain of left knee, initial encounter  S86.912A 844.9       Patient Active Problem List   Diagnosis   • Pelvic pain        Past Medical History:   Diagnosis Date   • Adenomyosis    • Menstrual irregularity    • Pelvic pain         Past Surgical History:   Procedure Laterality Date   •  SECTION   &    • ENDOMETRIAL ABLATION     • HYSTERECTOMY     • [place] LAP, RADICAL HYST W/ TUBE & OV, NODE BX Bilateral 2016    Procedure: TOTAL LAPAROSCOPIC HYSTERECTOMY AND MERON SALPINGECTOMY;  Surgeon: Janki Lowry MD;  Location: [facility];  Service: Gynecology                        PT Assessment/Plan     Row Name 22 0500          PT Assessment    Assessment Comments Patient continues to do well with PT, however she continues to report pain and difficulty performing certain activities. Patient has good left knee ROM and LLE strength. Pain continues to be her most limiting factor at this time.  -AS            PT Plan    PT Plan Comments Patient to see her MD on Monday. Will continue with outpatient PT as advised.  -AS           User Key  (r) = Recorded By, (t) = Taken By, (c) = Cosigned By    Initials Name Provider Type    AS Bry Sargent, PT Physical Therapist                   OP Exercises     Row Name 22 0500             Subjective Comments    Subjective Comments Patient states her knee is a \"little sore\". She states she is pretty much feeling about the same overall and she is scheduled to have an MD appointment on Monday.  -AS              Exercise 1    Exercise Name 1 Heel Slides  -AS              Exercise 2    Exercise Name 2 HS Stretch  -AS      Reps 2 10  -AS      Time 2 10 sec hold each  -AS              Exercise 3    Exercise Name 3 QS  -AS      Reps 3 25 "  -AS      Time 3 5 sec hold each  -AS              Exercise 4    Exercise Name 4 4-Way Hip  -AS      Reps 4 25 each  -AS      Time 4 3#  -AS              Exercise 5    Exercise Name 5 SAQ Ball Squeeze  -AS      Reps 5 40  -AS      Time 5 3#  -AS              Exercise 6    Exercise Name 6 LAQ Ball Squeeze  -AS      Reps 6 40  -AS      Time 6 3#  -AS              Exercise 7    Exercise Name 7 TKE  -AS      Reps 7 40  -AS      Time 7 Gold  -AS              Exercise 8    Exercise Name 8 Mini Squats  -AS      Reps 8 40  -AS              Exercise 9    Exercise Name 9 Lateral Dips - 4 inch step  -AS      Reps 9 25  -AS              Exercise 10    Exercise Name 10 Prone Quad Stretch  -AS              Exercise 11    Exercise Name 11 HS vs Stool  -AS              Exercise 12    Exercise Name 12 Seated HS Curl vs Band  -AS              Exercise 13    Exercise Name 13 Standing HS Curl  -AS              Exercise 14    Exercise Name 14 Supine Clams  -AS      Reps 14 40  -AS      Time 14 Gold  -AS              Exercise 15    Exercise Name 15 Bridge vs Band  -AS      Reps 15 40  -AS      Time 15 Gold  -AS              Exercise 16    Exercise Name 16 3-Way Cable Walks  -AS      Reps 16 5x each  -AS      Time 16 30#  -AS            User Key  (r) = Recorded By, (t) = Taken By, (c) = Cosigned By    Initials Name Provider Type    AS Bry Sargent, PT Physical Therapist                                                Time Calculation:   Start Time: 0552  Stop Time: 0623  Time Calculation (min): 31 min  Therapy Charges for Today     Code Description Service Date Service Provider Modifiers Qty    71462106534  PT THER PROC EA 15 MIN 2/18/2022 Bry Sargent, PT GP 2                    Bry Sargent, PT  2/18/2022

## 2022-02-21 ENCOUNTER — OFFICE VISIT (OUTPATIENT)
Dept: ORTHOPEDIC SURGERY | Facility: CLINIC | Age: 47
End: 2022-02-21

## 2022-02-21 VITALS — WEIGHT: 136 LBS | HEIGHT: 62 IN | BODY MASS INDEX: 25.03 KG/M2

## 2022-02-21 DIAGNOSIS — S83.522A RUPTURE OF POSTERIOR CRUCIATE LIGAMENT OF LEFT KNEE, INITIAL ENCOUNTER: Primary | ICD-10-CM

## 2022-02-21 PROCEDURE — 73562 X-RAY EXAM OF KNEE 3: CPT | Performed by: ORTHOPAEDIC SURGERY

## 2022-02-21 PROCEDURE — 99213 OFFICE O/P EST LOW 20 MIN: CPT | Performed by: ORTHOPAEDIC SURGERY

## 2022-02-21 RX ORDER — DICLOFENAC SODIUM 75 MG/1
75 TABLET, DELAYED RELEASE ORAL 2 TIMES DAILY
Qty: 42 TABLET | Refills: 0 | Status: SHIPPED | OUTPATIENT
Start: 2022-02-21 | End: 2022-04-22

## 2022-02-25 ENCOUNTER — TELEPHONE (OUTPATIENT)
Dept: ORTHOPEDIC SURGERY | Facility: CLINIC | Age: 47
End: 2022-02-25

## 2022-02-25 NOTE — TELEPHONE ENCOUNTER
Relationship: [facility]    Best call back number:     What form or medical record are you requesting: OFFICE NOTES AND WORK STATUS FROM 2/21 APPT    How would you like to receive the form or medical records (pick-up, mail, fax): FAX  If fax, what is the fax number:

## 2022-05-04 ENCOUNTER — OFFICE VISIT (OUTPATIENT)
Dept: ORTHOPEDIC SURGERY | Facility: CLINIC | Age: 47
End: 2022-05-04

## 2022-05-04 VITALS — BODY MASS INDEX: 25.4 KG/M2 | HEIGHT: 62 IN | WEIGHT: 138 LBS

## 2022-05-04 DIAGNOSIS — S86.912A STRAIN OF LEFT KNEE, INITIAL ENCOUNTER: ICD-10-CM

## 2022-05-04 DIAGNOSIS — S83.522A RUPTURE OF POSTERIOR CRUCIATE LIGAMENT OF LEFT KNEE, INITIAL ENCOUNTER: Primary | ICD-10-CM

## 2022-05-04 PROCEDURE — 99212 OFFICE O/P EST SF 10 MIN: CPT | Performed by: ORTHOPAEDIC SURGERY

## 2022-05-04 RX ORDER — DICLOFENAC SODIUM 75 MG/1
75 TABLET, DELAYED RELEASE ORAL 2 TIMES DAILY
Qty: 42 TABLET | Refills: 0 | Status: SHIPPED | OUTPATIENT
Start: 2022-05-04

## 2022-05-04 NOTE — PROGRESS NOTES
"Subjective:     Patient ID: Samantha Evans is a 47 y.o. female.    Chief Complaint:  Follow up left knee pain and instability, PCL tear- DOI 2021  Plan last visit-diclofenac, continue use of custom PCL brace    History of Present Illness  Samantha Evans returns to clinic today for evaluation of status post left knee posterior cruciate ligament repair. She states that she is doing well overall, and notes improvement in her symptoms. She rates her pain at a 4 to 5 out of 10, achy in nature, and occasional sharp pain. She reports occasional giving way and buckling episodes, particularly with prolonged activities. The patient is currently taking diclofenac.       Social History     Occupational History   • Not on file   Tobacco Use   • Smoking status: Former Smoker   • Smokeless tobacco: Former User   • Tobacco comment: 1 PP MONTH   Vaping Use   • Vaping Use: Never used   Substance and Sexual Activity   • Alcohol use: No   • Drug use: No   • Sexual activity: Defer      Past Medical History:   Diagnosis Date   • Adenomyosis    • Menstrual irregularity    • Pelvic pain      Past Surgical History:   Procedure Laterality Date   •  SECTION   &    • ENDOMETRIAL ABLATION     • HYSTERECTOMY     • [place] LAP, RADICAL HYST W/ TUBE & OV, NODE BX Bilateral 2016    Procedure: TOTAL LAPAROSCOPIC HYSTERECTOMY AND MERON SALPINGECTOMY;  Surgeon: Janki Lowry MD;  Location: [facility]  Service: Gynecology       History reviewed. No pertinent family history.      Review of Systems        Objective:  Vitals:    22 0805   Weight: 62.6 kg (138 lb)   Height: 157.5 cm (62\")         22  0805   Weight: 62.6 kg (138 lb)     Body mass index is 25.24 kg/m².  General: No acute distress.  Resp: normal respiratory effort  Skin: no rashes or wounds; normal turgor  Psych: mood and affect appropriate; recent and remote memory intact          Ortho Exam     Left Knee-    ROM 0 to 140 degrees  4+/5 on flexion  4+/5 " on extension  No extensor lag  Mild tenderness posterolateral joint line.    Effusion- Minimal to the knee.  Posterior drawer- Grade 2/3 with good endpoint.  Stable opening on varus and valgus stress at 0 and 30  Active patellar compression test- Negative    Log roll- Negative  Stinchfield- Negative    Gina's exam- Negative medial and lateral joint line.    Positive sensation light touch all distributions symmetric to contralateral side  Brisk cap refill all digits    Imaging:  None today  Assessment:        1. Rupture of posterior cruciate ligament of left knee, initial encounter    2. Strain of left knee, initial encounter           Plan:          1. Discussed treatment options at length with patient at today's visit. Overall, this patient is doing fairly well at this point in time. She is continuing to make progress as far as functional activities. She is having to continue to use her brace for longer distance. In this regard and because of the fact that her strength is not back up to full speed, particularly on her quad and given her residual laxity, we will hold her off from work as a  currently due to the prolonged demands with that job. I have recommended she continue using her brace for longer distance activities.   2. I will follow up in 4 months with repeat x-rays of the left knee.   3. Refill given on diclofenac for intermittent use if she has flares of pain. We can also consider an injection if needed.      Samantha Evans was in agreement with plan and had all questions answered.     Orders:  No orders of the defined types were placed in this encounter.      Medications:  New Medications Ordered This Visit   Medications   • diclofenac (VOLTAREN) 75 MG EC tablet     Sig: Take 1 tablet by mouth 2 (Two) Times a Day.     Dispense:  42 tablet     Refill:  0       Followup:  Return in about 4 months (around 9/4/2022) for xrays needed at follow up.    Diagnoses and all orders for this visit:    1.  Rupture of posterior cruciate ligament of left knee, initial encounter (Primary)    2. Strain of left knee, initial encounter    Other orders  -     diclofenac (VOLTAREN) 75 MG EC tablet; Take 1 tablet by mouth 2 (Two) Times a Day.  Dispense: 42 tablet; Refill: 0          Dictated utilizing Dragon dictation   Transcribed from ambient dictation for Lavell Barriga MD by Gayla Thomas.  05/04/22   09:27 EDT    Patient verbalized consent to the visit recording.

## 2022-07-18 ENCOUNTER — APPOINTMENT (OUTPATIENT)
Dept: WOMENS IMAGING | Facility: HOSPITAL | Age: 47
End: 2022-07-18

## 2022-07-18 PROCEDURE — G0279 TOMOSYNTHESIS, MAMMO: HCPCS | Performed by: RADIOLOGY

## 2022-07-18 PROCEDURE — 77061 BREAST TOMOSYNTHESIS UNI: CPT | Performed by: RADIOLOGY

## 2022-07-18 PROCEDURE — 77065 DX MAMMO INCL CAD UNI: CPT | Performed by: RADIOLOGY

## 2022-07-18 PROCEDURE — 76642 ULTRASOUND BREAST LIMITED: CPT | Performed by: RADIOLOGY

## 2022-09-22 ENCOUNTER — OFFICE VISIT (OUTPATIENT)
Dept: ORTHOPEDIC SURGERY | Facility: CLINIC | Age: 47
End: 2022-09-22

## 2022-09-22 VITALS — WEIGHT: 138 LBS | HEIGHT: 62 IN | BODY MASS INDEX: 25.4 KG/M2

## 2022-09-22 DIAGNOSIS — S83.522A RUPTURE OF POSTERIOR CRUCIATE LIGAMENT OF LEFT KNEE, INITIAL ENCOUNTER: Primary | ICD-10-CM

## 2022-09-22 PROCEDURE — 73562 X-RAY EXAM OF KNEE 3: CPT | Performed by: ORTHOPAEDIC SURGERY

## 2022-09-22 PROCEDURE — 99212 OFFICE O/P EST SF 10 MIN: CPT | Performed by: ORTHOPAEDIC SURGERY

## 2022-09-22 RX ORDER — LEVOTHYROXINE SODIUM 0.07 MG/1
75 TABLET ORAL DAILY
COMMUNITY
Start: 2022-08-02

## 2022-09-22 NOTE — PROGRESS NOTES
"Subjective:     Patient ID: Samantha Evans is a 47 y.o. female.    Chief Complaint:  Follow-up left knee pain and instability, PCL tear-date of injury 2021  Plan last visit-diclofenac for intermittent use, continue brace, continue strengthening    History of Present Illness  Samantha Evans returns to clinic today for evaluation ofof left knee injury. The patient is doing well overall, and notes improvement in her symptoms. She reports occasional stiffness and tightness, particularly on deep flexion activities or getting up from a seated position, but it is intermittent in nature. She rates her pain a 2 to 3 out of 10 when it does occur. The patient does have some sensation of instability with aggressive activities, but her brace does help stabilize that as well.      Social History     Occupational History   • Not on file   Tobacco Use   • Smoking status: Former Smoker   • Smokeless tobacco: Former User   • Tobacco comment: 1 PP MONTH   Vaping Use   • Vaping Use: Never used   Substance and Sexual Activity   • Alcohol use: No   • Drug use: No   • Sexual activity: Defer      Past Medical History:   Diagnosis Date   • Adenomyosis    • Menstrual irregularity    • Pelvic pain      Past Surgical History:   Procedure Laterality Date   •  SECTION   &    • ENDOMETRIAL ABLATION     • HYSTERECTOMY     • [place] LAP, RADICAL HYST W/ TUBE & OV, NODE BX Bilateral 2016    Procedure: TOTAL LAPAROSCOPIC HYSTERECTOMY AND MERON SALPINGECTOMY;  Surgeon: Janki Lowry MD;  Location: [facility];  Service: Gynecology       No family history on file.      Review of Systems        Objective:  Vitals:    22 1037   Weight: 62.6 kg (138 lb)   Height: 157.5 cm (62\")         22  1037   Weight: 62.6 kg (138 lb)     Body mass index is 25.24 kg/m².  General: No acute distress.  Resp: normal respiratory effort  Skin: no rashes or wounds; normal turgor  Psych: mood and affect appropriate; recent and remote memory " intact          Ortho Exam       Left Knee-    ROM 0 to 140 degrees  5/5 on flexion  5/5 on extension  No medial or lateral joint line pain.  Effusion- None  Grade 1A Lachman  Anterior drawer- Negative  Posterior drawer- Grade 1/2 with good endpoint noted.  Stable opening on varus and valgus stress at 0 and 30  Active patellar compression test- Negative    Imaging:  Left Knee X-Ray  Indication: Follow-up status post PCL tear    AP, Lateral, and [place] views    Findings:  No fracture  No bony lesion  Normal soft tissues  Normal joint spaces-no significant posterior tibial translation on lateral x-ray    Compared to prior office x-rays    Assessment:        1. Rupture of posterior cruciate ligament of left knee, initial encounter           Plan:        1.Discussed treatment options at length with patient at today's visit. At this point in time, she is doing well. Recommended continued strengthening and functional activities. PCL brace only necessary for any cutting, pivoting, aggressive uneven terrain activities or activities such as downhill skiing.  2. She is released to return to all work activities without restrictions at this point in time.  3. She is at maximal medical improvement at this point in time and I will plan to see her back as needed.      Samantha Evans was in agreement with plan and had all questions answered.     Orders:  Orders Placed This Encounter   Procedures   • XR Knee 3+ View With [place] Left       Medications:  No orders of the defined types were placed in this encounter.      Followup:  Return if symptoms worsen or fail to improve.    Diagnoses and all orders for this visit:    1. Rupture of posterior cruciate ligament of left knee, initial encounter (Primary)  -     XR Knee 3+ View With [place] Left      Transcribed from ambient dictation for Lavell Barriga MD by Kourtney Tan.  09/22/22   12:10 EDT    Patient verbalized consent to the visit recording.  I have personally performed  the services described in this document as transcribed by the above individual, and it is both accurate and complete.

## 2024-09-26 ENCOUNTER — APPOINTMENT (OUTPATIENT)
Dept: WOMENS IMAGING | Facility: HOSPITAL | Age: 49
End: 2024-09-26
Payer: COMMERCIAL

## 2024-09-26 PROCEDURE — 77066 DX MAMMO INCL CAD BI: CPT | Performed by: RADIOLOGY

## 2024-09-26 PROCEDURE — 77062 BREAST TOMOSYNTHESIS BI: CPT | Performed by: RADIOLOGY

## 2024-09-26 PROCEDURE — G0279 TOMOSYNTHESIS, MAMMO: HCPCS | Performed by: RADIOLOGY

## 2024-09-26 PROCEDURE — 76642 ULTRASOUND BREAST LIMITED: CPT | Performed by: RADIOLOGY

## 2024-11-06 ENCOUNTER — OFFICE VISIT (OUTPATIENT)
Dept: ENDOCRINOLOGY | Age: 49
End: 2024-11-06
Payer: COMMERCIAL

## 2024-11-06 ENCOUNTER — TELEPHONE (OUTPATIENT)
Dept: ENDOCRINOLOGY | Age: 49
End: 2024-11-06

## 2024-11-06 VITALS
SYSTOLIC BLOOD PRESSURE: 130 MMHG | BODY MASS INDEX: 26.16 KG/M2 | WEIGHT: 143 LBS | HEART RATE: 61 BPM | TEMPERATURE: 98.1 F | OXYGEN SATURATION: 99 % | DIASTOLIC BLOOD PRESSURE: 90 MMHG

## 2024-11-06 DIAGNOSIS — E03.9 ACQUIRED HYPOTHYROIDISM: Primary | ICD-10-CM

## 2024-11-06 RX ORDER — ESTRADIOL 0.05 MG/D
1 PATCH, EXTENDED RELEASE TRANSDERMAL 2 TIMES WEEKLY
COMMUNITY
Start: 2024-10-18

## 2024-11-06 NOTE — PROGRESS NOTES
Chief complaint   No chief complaint on file.         Subjective     History of Present Illness:      This is a 49 years old female I am seeing for thyroid disease   referred by PCP   Came in late   other medical issues   1- Hysterectomy   2- H/o C section   3-  Other   Pt was Dx with hypothyroid many years ago in middle school   Is not on thyroid medication now   had blood work done by PCP ,TSH 5.7. t4 free 1.9    pt states that she was on thyroid medication since  till  , after  no medication and was checked and need for medication and levels were normal in  and now checked and TSH was off and  is feeling tired and there is no history head and Neck radiation, no History of thyroid surgery, no family history of Thyroid disease, No prior history of Thyroid Dysfunction,  and has No dysphagia, No dyspnea, No dysphonia  No change size of neck, No neck pain or discomfort  No nervousness, No shakiness, No palpitations  Weight stable   BM daily, No diarrhea, No constipation  No edema, No proximal muscle weak  No Cold or Heat Intolerance  No Insomnia  No hair Loss, No Skin Drynress  Appetite is OK  No visual changes      No family history on file.  Social History     Socioeconomic History    Marital status:    Tobacco Use    Smoking status: Former    Smokeless tobacco: Former    Tobacco comments:     1 PP MONTH   Vaping Use    Vaping status: Never Used   Substance and Sexual Activity    Alcohol use: No    Drug use: No    Sexual activity: Defer     Past Medical History:   Diagnosis Date    Adenomyosis     Menstrual irregularity     Pelvic pain      Past Surgical History:   Procedure Laterality Date     SECTION   &     ENDOMETRIAL ABLATION      HYSTERECTOMY      [place] LAP, RADICAL HYST W/ TUBE & OV, NODE BX Bilateral 2016    Procedure: TOTAL LAPAROSCOPIC HYSTERECTOMY AND MERON SALPINGECTOMY;  Surgeon: Janki Lowry MD;  Location: [facility] OR;  Service: Gynecology       Current  Outpatient Medications:     diclofenac (VOLTAREN) 75 MG EC tablet, Take 1 tablet by mouth 2 (Two) Times a Day., Disp: 42 tablet, Rfl: 0    fluorouracil (EFUDEX) 5 % cream, APPLY THIN LAYER TO FACE, CHEST, FULL ARMS, HANDS AND SHOULDERS EVERY EVENING. WASH OFF IN MORNING, Disp: , Rfl:     guaiFENesin (MUCINEX) 600 MG 12 hr tablet, Take 1,200 mg by mouth 2 (Two) Times a Day., Disp: , Rfl:     levothyroxine (SYNTHROID, LEVOTHROID) 75 MCG tablet, Take 75 mcg by mouth Daily., Disp: , Rfl:   Hydrocodone    Objective   There were no vitals filed for this visit.       Physical Exam  Neurological:      General: No focal deficit present.      Mental Status: She is alert and oriented to person, place, and time.               There are no diagnoses linked to this encounter.     Assessment:     This is a 49 years old female I am seeing for thyroid disease, referred by PCP     Pt was Dx with hypothyroid many years ago in middle school   Is not on thyroid medication now for last few years   had blood work done by PCP ,TSH 5.7. t4 free 1.9    pt states that she was on thyroid medication since 1985 till 1995 , after 1995 no medication and was checked and need for medication and levels were normal in 2023 and now checked and TSH was off and  is feeling tired and likes to know if she needs to be on medication or not, had a long discussion about thyroid and we talked about avoiding low TSH as that can cause heart and bone disease and that not all sx can be related to thyroid.       Plan:    1. No treatment now but if TSH is high will start Levothyroxine 25 mcg daily   2. Labs today :TSH, Free T4    3. Has family hx of thyroid cancer and will get a thyroid US at the next visit   4. Return in 2 months   5. Was informed that will check her for Hashimoto disease   6. Labs reviewed with the Patient : TSH   7- I reviewed the notes from PCP   8- Needs to go back on PCP for all of the other sx he is having   9- Instructions for taking  levothyroxine   Brand name is preferred   Take thyroid pill all by itself   Take thyroid pill one hour before food or 2 to 3 hours after food   Heat, humidity, and direct sunlight will cause a loss of potency .      I discussed with the patient/legal representative the risks and the benefits associated with the medications.  The patient/legal representative has been given the opportunity to ask questions.  Alternatives to the proposed treatment (s) were discussed, including the likely results of no treatment.  The patient/legal representative wishes to proceed.       Brian Roberts MD   11/06/24  08:27 EST

## 2024-11-06 NOTE — TELEPHONE ENCOUNTER
PT IS STUCK IN TRAFFIC AND WAS TRYING TO GET TO THE 830AM APT TODAY. PT IS ASKING IF THERE IS ANOTHER SLOT TO PUT HER IN LATER DUE TO HER TAKING OFF OF WORK FOR THIS APT. PLEASE CALL BACK.

## 2024-11-07 DIAGNOSIS — E03.9 ACQUIRED HYPOTHYROIDISM: Primary | ICD-10-CM

## 2024-11-07 LAB
T4 FREE SERPL-MCNC: 1.1 NG/DL (ref 0.92–1.68)
THYROPEROXIDASE AB SERPL-ACNC: 42 IU/ML (ref 0–34)
TSH SERPL DL<=0.005 MIU/L-ACNC: 5.69 UIU/ML (ref 0.27–4.2)

## 2024-11-07 RX ORDER — LEVOTHYROXINE SODIUM 25 UG/1
25 TABLET ORAL DAILY
Qty: 30 TABLET | Refills: 1 | Status: SHIPPED | OUTPATIENT
Start: 2024-11-07

## 2024-11-08 ENCOUNTER — PATIENT ROUNDING (BHMG ONLY) (OUTPATIENT)
Dept: ENDOCRINOLOGY | Age: 49
End: 2024-11-08
Payer: COMMERCIAL

## 2024-11-12 DIAGNOSIS — E03.9 ACQUIRED HYPOTHYROIDISM: ICD-10-CM

## 2024-11-12 RX ORDER — LEVOTHYROXINE SODIUM 25 UG/1
25 TABLET ORAL DAILY
Qty: 30 TABLET | Refills: 1 | Status: SHIPPED | OUTPATIENT
Start: 2024-11-12

## 2024-11-12 NOTE — TELEPHONE ENCOUNTER
Caller: JEF AMEZCUA    Relationship to patient: SELF    Best call back number: [phone]    Patient is needing: PATIENT PRESCRIPTION LEVOTHYROXINE IS AT THE WRONG PHARMACY AND NEEDS TO GO TO [facility] [id number] - [facility] [address] AT [facility], [place], operated by [facility] [phone] [facility] [phone]  PLEASE ADVISE WHEN IT HAS BEEN SENT OVER TO [facility]

## 2024-11-19 ENCOUNTER — OFFICE VISIT (OUTPATIENT)
Dept: FAMILY MEDICINE CLINIC | Facility: CLINIC | Age: 49
End: 2024-11-19
Payer: COMMERCIAL

## 2024-11-19 VITALS
SYSTOLIC BLOOD PRESSURE: 112 MMHG | BODY MASS INDEX: 26.68 KG/M2 | DIASTOLIC BLOOD PRESSURE: 72 MMHG | TEMPERATURE: 97.9 F | RESPIRATION RATE: 18 BRPM | WEIGHT: 145 LBS | HEART RATE: 74 BPM | OXYGEN SATURATION: 95 % | HEIGHT: 62 IN

## 2024-11-19 DIAGNOSIS — E03.9 ACQUIRED HYPOTHYROIDISM: Primary | ICD-10-CM

## 2024-11-19 DIAGNOSIS — Z12.11 SCREENING FOR COLON CANCER: ICD-10-CM

## 2024-11-19 DIAGNOSIS — L98.9 SKIN LESION: ICD-10-CM

## 2024-11-19 PROCEDURE — 99213 OFFICE O/P EST LOW 20 MIN: CPT | Performed by: NURSE PRACTITIONER

## 2024-11-19 NOTE — PROGRESS NOTES
"Chief Complaint  Establish Care and Rash  (Patient states that she has 2 spots on her that she would like to be sent to a dermatologist for. )    Subjective        Samantha Evans presents to [facility]  History of Present Illness  This is a 49-year-old female patient here today to establish care.  She has a history of hypothyroidism and is on Synthroid.  She is being followed by endocrinology.  She denies any intolerance to cold or heat.        Womend first - red jaggers pap was last month now on patch, mammo - dense tissue, this last one normal. Cysti n the past removed, no breast cancer hx    Hx skin cancer in family   Does recent spot on back and rt thigh noticed in the last 3 week spot behind bra strap        Objective   Vital Signs:  /72   Pulse 74   Temp 97.9 °F (36.6 °C) (Infrared)   Resp 18   Ht 157.5 cm (62\")   Wt 65.8 kg (145 lb)   SpO2 95%   BMI 26.52 kg/m²   Estimated body mass index is 26.52 kg/m² as calculated from the following:    Height as of this encounter: 157.5 cm (62\").    Weight as of this encounter: 65.8 kg (145 lb).            Physical Exam  Vitals and nursing note reviewed.   HENT:      Head: Normocephalic.      Nose: Nose normal.   Eyes:      Pupils: Pupils are equal, round, and reactive to light.   Cardiovascular:      Rate and Rhythm: Normal rate and regular rhythm.      Pulses: Normal pulses.      Heart sounds: Normal heart sounds.   Pulmonary:      Effort: Pulmonary effort is normal. No respiratory distress.      Breath sounds: Normal breath sounds. No wheezing or rales.   Abdominal:      General: Bowel sounds are normal. There is no distension.      Tenderness: There is no abdominal tenderness.   Musculoskeletal:         General: No swelling.      Cervical back: Neck supple.      Right lower leg: No edema.      Left lower leg: No edema.   Skin:     General: Skin is warm and dry.      Comments: Skin lesion noted on left upper back around bra " colin.  No signs of infection   Neurological:      Mental Status: She is alert and oriented to person, place, and time.   Psychiatric:         Mood and Affect: Mood normal.        Result Review :                Assessment and Plan   Diagnoses and all orders for this visit:    1. Acquired hypothyroidism (Primary)    2. Skin lesion  -     Ambulatory Referral to Dermatology    3. Screening for colon cancer  -     Cologuard - Stool, Per Rectum; Future       Refer dermatogy  Cologuard          Follow Up   Return in about 6 months (around 5/19/2025).  Patient was given instructions and counseling regarding her condition or for health maintenance advice. Please see specific information pulled into the AVS if appropriate.

## 2024-11-21 ENCOUNTER — LAB (OUTPATIENT)
Dept: FAMILY MEDICINE CLINIC | Facility: CLINIC | Age: 49
End: 2024-11-21
Payer: COMMERCIAL

## 2024-11-21 DIAGNOSIS — Z13.220 SCREENING FOR HYPERLIPIDEMIA: Primary | ICD-10-CM

## 2024-11-21 DIAGNOSIS — Z13.0 SCREENING FOR DEFICIENCY ANEMIA: ICD-10-CM

## 2024-11-21 DIAGNOSIS — Z13.89 SCREENING FOR HEMATURIA OR PROTEINURIA: ICD-10-CM

## 2024-11-22 LAB
ALBUMIN SERPL-MCNC: 4.3 G/DL (ref 3.5–5.2)
ALBUMIN/GLOB SERPL: 2.2 G/DL
ALP SERPL-CCNC: 77 U/L (ref 39–117)
ALT SERPL-CCNC: 11 U/L (ref 1–33)
APPEARANCE UR: CLEAR
AST SERPL-CCNC: 17 U/L (ref 1–32)
BACTERIA #/AREA URNS HPF: NORMAL /[HPF]
BASOPHILS # BLD AUTO: 0.04 10*3/MM3 (ref 0–0.2)
BASOPHILS NFR BLD AUTO: 0.7 % (ref 0–1.5)
BILIRUB SERPL-MCNC: 0.2 MG/DL (ref 0–1.2)
BILIRUB UR QL STRIP: NEGATIVE
BUN SERPL-MCNC: 14 MG/DL (ref 6–20)
BUN/CREAT SERPL: 19.4 (ref 7–25)
CALCIUM SERPL-MCNC: 8.9 MG/DL (ref 8.6–10.5)
CASTS URNS QL MICRO: NORMAL /LPF
CHLORIDE SERPL-SCNC: 102 MMOL/L (ref 98–107)
CHOLEST SERPL-MCNC: 215 MG/DL (ref 0–200)
CO2 SERPL-SCNC: 26.3 MMOL/L (ref 22–29)
COLOR UR: YELLOW
CREAT SERPL-MCNC: 0.72 MG/DL (ref 0.57–1)
EGFRCR SERPLBLD CKD-EPI 2021: 101.4 ML/MIN/1.73
EOSINOPHIL # BLD AUTO: 0.11 10*3/MM3 (ref 0–0.4)
EOSINOPHIL NFR BLD AUTO: 2 % (ref 0.3–6.2)
EPI CELLS #/AREA URNS HPF: NORMAL /HPF (ref 0–10)
ERYTHROCYTE [DISTWIDTH] IN BLOOD BY AUTOMATED COUNT: 13.1 % (ref 12.3–15.4)
GLOBULIN SER CALC-MCNC: 2 GM/DL
GLUCOSE SERPL-MCNC: 76 MG/DL (ref 65–99)
GLUCOSE UR QL STRIP: NEGATIVE
HCT VFR BLD AUTO: 39 % (ref 34–46.6)
HDLC SERPL-MCNC: 63 MG/DL (ref 40–60)
HGB BLD-MCNC: 12.2 G/DL (ref 12–15.9)
HGB UR QL STRIP: NEGATIVE
IMM GRANULOCYTES # BLD AUTO: 0.03 10*3/MM3 (ref 0–0.05)
IMM GRANULOCYTES NFR BLD AUTO: 0.5 % (ref 0–0.5)
KETONES UR QL STRIP: NEGATIVE
LDLC SERPL CALC-MCNC: 138 MG/DL (ref 0–100)
LEUKOCYTE ESTERASE UR QL STRIP: NEGATIVE
LYMPHOCYTES # BLD AUTO: 1.72 10*3/MM3 (ref 0.7–3.1)
LYMPHOCYTES NFR BLD AUTO: 30.6 % (ref 19.6–45.3)
MCH RBC QN AUTO: 27.4 PG (ref 26.6–33)
MCHC RBC AUTO-ENTMCNC: 31.3 G/DL (ref 31.5–35.7)
MCV RBC AUTO: 87.4 FL (ref 79–97)
MICRO URNS: NORMAL
MICRO URNS: NORMAL
MONOCYTES # BLD AUTO: 0.51 10*3/MM3 (ref 0.1–0.9)
MONOCYTES NFR BLD AUTO: 9.1 % (ref 5–12)
NEUTROPHILS # BLD AUTO: 3.22 10*3/MM3 (ref 1.7–7)
NEUTROPHILS NFR BLD AUTO: 57.1 % (ref 42.7–76)
NITRITE UR QL STRIP: NEGATIVE
NRBC BLD AUTO-RTO: 0 /100 WBC (ref 0–0.2)
PH UR STRIP: 5.5 [PH] (ref 5–7.5)
PLATELET # BLD AUTO: 325 10*3/MM3 (ref 140–450)
POTASSIUM SERPL-SCNC: 4.3 MMOL/L (ref 3.5–5.2)
PROT SERPL-MCNC: 6.3 G/DL (ref 6–8.5)
PROT UR QL STRIP: NEGATIVE
RBC # BLD AUTO: 4.46 10*6/MM3 (ref 3.77–5.28)
RBC #/AREA URNS HPF: NORMAL /HPF (ref 0–2)
SODIUM SERPL-SCNC: 138 MMOL/L (ref 136–145)
SP GR UR STRIP: 1.02 (ref 1–1.03)
TRIGL SERPL-MCNC: 78 MG/DL (ref 0–150)
URINALYSIS REFLEX: NORMAL
UROBILINOGEN UR STRIP-MCNC: 0.2 MG/DL (ref 0.2–1)
VLDLC SERPL CALC-MCNC: 14 MG/DL (ref 5–40)
WBC # BLD AUTO: 5.63 10*3/MM3 (ref 3.4–10.8)
WBC #/AREA URNS HPF: NORMAL /HPF (ref 0–5)

## 2024-11-25 NOTE — PROGRESS NOTES
All lab work is stable.  Cholesterol is slightly above goal but cardiovascular risk is low.  Recommend low-fat diet and increase exercise.  Would like to repeat again in 6 months

## 2025-04-01 ENCOUNTER — OFFICE VISIT (OUTPATIENT)
Dept: FAMILY MEDICINE CLINIC | Facility: CLINIC | Age: 50
End: 2025-04-01
Payer: COMMERCIAL

## 2025-04-01 VITALS
HEIGHT: 62 IN | TEMPERATURE: 98.4 F | BODY MASS INDEX: 27.66 KG/M2 | RESPIRATION RATE: 16 BRPM | HEART RATE: 82 BPM | DIASTOLIC BLOOD PRESSURE: 68 MMHG | WEIGHT: 150.3 LBS | OXYGEN SATURATION: 96 % | SYSTOLIC BLOOD PRESSURE: 106 MMHG

## 2025-04-01 DIAGNOSIS — Z12.11 SCREENING FOR COLON CANCER: ICD-10-CM

## 2025-04-01 DIAGNOSIS — Z00.00 ROUTINE ADULT HEALTH MAINTENANCE: Primary | ICD-10-CM

## 2025-04-01 DIAGNOSIS — E03.9 ACQUIRED HYPOTHYROIDISM: ICD-10-CM

## 2025-04-01 DIAGNOSIS — Z12.11 ENCOUNTER FOR SCREENING FOR MALIGNANT NEOPLASM OF COLON: ICD-10-CM

## 2025-04-01 DIAGNOSIS — Z11.59 NEED FOR HEPATITIS C SCREENING TEST: ICD-10-CM

## 2025-04-01 DIAGNOSIS — R21 FACIAL RASH: ICD-10-CM

## 2025-04-01 RX ORDER — ESTRADIOL 0.07 MG/D
1 FILM, EXTENDED RELEASE TRANSDERMAL
COMMUNITY
Start: 2025-02-18

## 2025-04-01 NOTE — PROGRESS NOTES
"Chief Complaint  Weight Gain (Patient wants to possibly get on something for weight gain. She states that she has dieted and exercised and nothing is helping her loose weight. ) and Annual Exam    Subjective        Samantha Evans presents to [facility]  History of Present Illness  This is a 50-year-old female patient here today for weight gain.  Patient is being followed by endocrinology for hypothyroidism and is on Synthroid appointment 4/8/2025.  TSH is elevated.  She is concerned with her weight gain.  She has been working on restricting her calories walking and cutting out soft drinks.  She does report feeling more flushed in her face.  She says this has been a concern in the last 6 months.  She is on estradiol prescribed by OB/GYN that was increased in February.  She was on the compound medication at 1 point for her weight that worked well but it was getting expensive.  Her blood pressure is stable.  She denies any chest pain or shortness of breath today.      Objective   Vital Signs:  /68   Pulse 82   Temp 98.4 °F (36.9 °C) (Infrared)   Resp 16   Ht 157.5 cm (62\")   Wt 68.2 kg (150 lb 4.8 oz)   SpO2 96%   BMI 27.49 kg/m²   Estimated body mass index is 27.49 kg/m² as calculated from the following:    Height as of this encounter: 157.5 cm (62\").    Weight as of this encounter: 68.2 kg (150 lb 4.8 oz).            Physical Exam  Vitals and nursing note reviewed.   HENT:      Head: Normocephalic.      Nose: Nose normal.   Eyes:      Pupils: Pupils are equal, round, and reactive to light.   Cardiovascular:      Rate and Rhythm: Normal rate and regular rhythm.      Pulses: Normal pulses.      Heart sounds: Normal heart sounds.   Pulmonary:      Effort: Pulmonary effort is normal. No respiratory distress.      Breath sounds: Normal breath sounds. No wheezing or rales.   Abdominal:      General: Bowel sounds are normal. There is no distension.      Tenderness: There is no " abdominal tenderness.   Musculoskeletal:         General: No swelling.      Cervical back: Neck supple.      Right lower leg: No edema.      Left lower leg: No edema.   Skin:     General: Skin is warm and dry.   Neurological:      Mental Status: She is alert and oriented to person, place, and time.   Psychiatric:         Mood and Affect: Mood normal.        Result Review :                Assessment and Plan   Diagnoses and all orders for this visit:    1. Routine adult health maintenance (Primary)  -     Hemoglobin A1c    2. Need for hepatitis C screening test  -     Hepatitis C Antibody    3. Encounter for screening for malignant neoplasm of colon  -     Cologuard - Stool, Per Rectum; Future    4. Facial rash  -     Rheumatoid Factor  -     Cyclic Citrul Peptide Antibody, IgG / IgA  -     Sedimentation Rate    5. Acquired hypothyroidism  -     TSH  -     T4, Free  -     T3    6. Screening for colon cancer    We will obtain her blood work today.  We discussed her obesity.  She did try compound with success but reports it was expensive.  We did discuss phentermine but we both feel that is not a great option as well.  She is working on counting calories and exercising.  She will follow-up with endocrinology for her thyroid   I will place order for Cologuard      The patient was counseled regarding nutrition, physical activity, healthy weight, injury prevention, misuse of tobacco, alcohol and illicit drugs, sexual behavior and STI's, contraception, dental health, mental health, immunizations, and screenings.            Follow Up   No follow-ups on file.  Patient was given instructions and counseling regarding her condition or for health maintenance advice. Please see specific information pulled into the AVS if appropriate.

## 2025-04-02 LAB
CCP IGA+IGG SERPL IA-ACNC: 6 UNITS (ref 0–19)
ERYTHROCYTE [SEDIMENTATION RATE] IN BLOOD BY WESTERGREN METHOD: 25 MM/HR (ref 0–40)
HBA1C MFR BLD: 5.3 % (ref 4.8–5.6)
HCV IGG SERPL QL IA: NON REACTIVE
RHEUMATOID FACT SERPL-ACNC: <10 IU/ML
T3 SERPL-MCNC: 119 NG/DL (ref 71–180)
T4 FREE SERPL-MCNC: 1.1 NG/DL (ref 0.82–1.77)
TSH SERPL DL<=0.005 MIU/L-ACNC: 5.59 UIU/ML (ref 0.45–4.5)

## 2025-04-09 ENCOUNTER — OFFICE VISIT (OUTPATIENT)
Dept: ENDOCRINOLOGY | Age: 50
End: 2025-04-09
Payer: COMMERCIAL

## 2025-04-09 VITALS
OXYGEN SATURATION: 97 % | DIASTOLIC BLOOD PRESSURE: 72 MMHG | HEART RATE: 59 BPM | WEIGHT: 149.8 LBS | TEMPERATURE: 97.7 F | BODY MASS INDEX: 27.4 KG/M2 | SYSTOLIC BLOOD PRESSURE: 118 MMHG

## 2025-04-09 DIAGNOSIS — E03.9 ACQUIRED HYPOTHYROIDISM: ICD-10-CM

## 2025-04-09 RX ORDER — LEVOTHYROXINE SODIUM 50 UG/1
50 TABLET ORAL DAILY
Qty: 30 TABLET | Refills: 2 | Status: SHIPPED | OUTPATIENT
Start: 2025-04-09

## 2025-04-09 NOTE — PROGRESS NOTES
Chief complaint   Chief Complaint   Patient presents with    Hypothyroidism          Subjective     History of Present Illness:      This is a 50 years old female I am seeing for thyroid disease   referred by PCP   Last OV was > 4 months ago . Missed last visit   She is here for a follow up   other medical issues   1- Hysterectomy   2- H/o C section   3-  Other   Pt was Dx with hypothyroid many years ago in middle school   Is on Levothyroxine 25 mcg daily   had blood work done by PCP ,TSH 5.7. t4 free 1.9  had blood work done  ,TSH 5.5. t4 free 1.1  pt states that she took levothyroxine for the last 4 months and did not see any benefits as her wt is up and she is now on a higher dose of Estrogen was on thyroid medication since 1985 till 1995 , after 1995 no medication and was checked and need for medication and levels were normal in 2023 and now checked and TSH was off and  is feeling tired and there is no history head and Neck radiation, no History of thyroid surgery, no family history of Thyroid disease, No prior history of Thyroid Dysfunction,  and has No dysphagia, No dyspnea, No dysphonia  No change size of neck, No neck pain or discomfort  No nervousness, No shakiness, No palpitations    BM daily, No diarrhea, No constipation  No edema, No proximal muscle weak  No Cold or Heat Intolerance  No Insomnia  No hair Loss, No Skin Drynress  Appetite is OK  No visual changes     Latest Reference Range & Units 11/06/24 10:03 04/01/25 14:30   TSH Baseline 0.450 - 4.500 uIU/mL 5.690 (H) 5.590 (H)   Free T4 0.82 - 1.77 ng/dL 1.10 1.10   T3, Total 71 - 180 ng/dL  119   Thyroid Peroxidase Antibody 0 - 34 IU/mL 42 (H)    (H): Data is abnormally high  Family History   Problem Relation Age of Onset    Cancer Mother         Thyroid     Social History     Socioeconomic History    Marital status: Significant Other   Tobacco Use    Smoking status: Former     Current packs/day: 0.25     Average packs/day: 0.3 packs/day for 15.3  years (3.8 ttl pk-yrs)     Types: Cigarettes     Start date:     Smokeless tobacco: Former    Tobacco comments:     1 PP MONTH   Vaping Use    Vaping status: Never Used   Substance and Sexual Activity    Alcohol use: No    Drug use: No    Sexual activity: Yes     Partners: Male     Birth control/protection: Hysterectomy     Past Medical History:   Diagnosis Date    Adenomyosis     Hypothyroidism 1985    Menstrual irregularity     Pelvic pain      Past Surgical History:   Procedure Laterality Date     SECTION   &     ENDOMETRIAL ABLATION      HYSTERECTOMY      [place] LAPS W/RAD HYST W/BILAT LMPHADEC RMVL TUBE/OVARY Bilateral 2016    Procedure: TOTAL LAPAROSCOPIC HYSTERECTOMY AND MERON SALPINGECTOMY;  Surgeon: Janki Lowry MD;  Location: [facility];  Service: Gynecology       Current Outpatient Medications:     estradiol (MINIVELLE, VIVELLE-DOT) 0.075 MG/24HR patch, Place 1 patch on the skin as directed by provider., Disp: , Rfl:     levothyroxine (SYNTHROID, LEVOTHROID) 50 MCG tablet, Take 1 tablet by mouth Daily., Disp: 30 tablet, Rfl: 2    diclofenac (VOLTAREN) 75 MG EC tablet, Take 1 tablet by mouth 2 (Two) Times a Day. (Patient not taking: Reported on 2025), Disp: 42 tablet, Rfl: 0    fluorouracil (EFUDEX) 5 % cream, APPLY THIN LAYER TO FACE, CHEST, FULL ARMS, HANDS AND SHOULDERS EVERY EVENING. WASH OFF IN MORNING (Patient not taking: Reported on 2025), Disp: , Rfl:     guaiFENesin (MUCINEX) 600 MG 12 hr tablet, Take 2 tablets by mouth 2 (Two) Times a Day. (Patient not taking: Reported on 2025), Disp: , Rfl:   Hydrocodone    Objective   Vitals:    25 0845   BP: 118/72   Pulse: 59   Temp: 97.7 °F (36.5 °C)   SpO2: 97%          Physical Exam  Neurological:      General: No focal deficit present.      Mental Status: She is alert and oriented to person, place, and time.               Diagnoses and all orders for this visit:    1. Acquired hypothyroidism  -     levothyroxine  (SYNTHROID, LEVOTHROID) 50 MCG tablet; Take 1 tablet by mouth Daily.  Dispense: 30 tablet; Refill: 2         Assessment:     This is a 50 years old female I am seeing for thyroid disease, referred by PCP     Pt was Dx with hypothyroid many years ago in middle school   Is on Levothyroxine 25 mcg daily   had blood work done by PCP ,TSH 5.7. t4 free 1.9  had blood work done  ,TSH 5.5. t4 free 1.1  Also on Estrogen that can make the need for thyroid higher, had a long discussion about thyroid and we talked about avoiding low TSH as that can cause heart and bone disease and that not all sx can be related to thyroid.       Plan:    1. Change to Levothyroxine 50 mcg daily   2. Labs at the next visit :TSH, Free T4    3. Has family hx of thyroid cancer and will get a thyroid US at the next visit   4. Return in 3 months   5. Was informed that she has Hashimoto disease as her Tpo AB was +  6. Labs reviewed with the Patient : TSH   7- I reviewed the notes from PCP   8- Needs to go back on PCP for all of the other sx she is having   9- Instructions for taking levothyroxine   Brand name is preferred   Take thyroid pill all by itself   Take thyroid pill one hour before food or 2 to 3 hours after food   Heat, humidity, and direct sunlight will cause a loss of potency .      I discussed with the patient/legal representative the risks and the benefits associated with the medications.  The patient/legal representative has been given the opportunity to ask questions.  Alternatives to the proposed treatment (s) were discussed, including the likely results of no treatment.  The patient/legal representative wishes to proceed.       Brian Roberts MD   04/09/25  09:01 EDT

## 2025-04-16 DIAGNOSIS — E78.2 MIXED HYPERLIPIDEMIA: Primary | ICD-10-CM
